# Patient Record
Sex: FEMALE | Race: WHITE | NOT HISPANIC OR LATINO | Employment: UNEMPLOYED | URBAN - METROPOLITAN AREA
[De-identification: names, ages, dates, MRNs, and addresses within clinical notes are randomized per-mention and may not be internally consistent; named-entity substitution may affect disease eponyms.]

---

## 2021-02-12 ENCOUNTER — HISTORICAL (OUTPATIENT)
Dept: LAB | Facility: HOSPITAL | Age: 56
End: 2021-02-12

## 2021-02-12 LAB
ALBUMIN SERPL-MCNC: 4 GM/DL (ref 3.5–5)
ALP SERPL-CCNC: 52 UNIT/L (ref 40–150)
ALT SERPL-CCNC: 17 UNIT/L (ref 0–55)
AST SERPL-CCNC: 20 UNIT/L (ref 5–34)
BILIRUB SERPL-MCNC: 0.5 MG/DL
BILIRUBIN DIRECT+TOT PNL SERPL-MCNC: 0.2 MG/DL (ref 0–0.5)
BILIRUBIN DIRECT+TOT PNL SERPL-MCNC: 0.3 MG/DL (ref 0–0.8)
BUN SERPL-MCNC: 13.4 MG/DL (ref 9.8–20.1)
CALCIUM SERPL-MCNC: 9 MG/DL (ref 8.4–10.2)
CHLORIDE SERPL-SCNC: 102 MMOL/L (ref 98–107)
CO2 SERPL-SCNC: 28 MMOL/L (ref 22–29)
CREAT SERPL-MCNC: 0.58 MG/DL (ref 0.55–1.02)
CREAT/UREA NIT SERPL: 23
ERYTHROCYTE [DISTWIDTH] IN BLOOD BY AUTOMATED COUNT: 12.9 % (ref 11.5–17)
GLUCOSE SERPL-MCNC: 91 MG/DL (ref 74–100)
HCT VFR BLD AUTO: 41.6 % (ref 37–47)
HGB BLD-MCNC: 13.5 GM/DL (ref 12–16)
MCH RBC QN AUTO: 31 PG (ref 27–31)
MCHC RBC AUTO-ENTMCNC: 32.5 GM/DL (ref 33–36)
MCV RBC AUTO: 95.4 FL (ref 80–94)
PLATELET # BLD AUTO: 322 X10(3)/MCL (ref 130–400)
PMV BLD AUTO: 10 FL (ref 9.4–12.4)
POTASSIUM SERPL-SCNC: 3.7 MMOL/L (ref 3.5–5.1)
PROT SERPL-MCNC: 6.8 GM/DL (ref 6.4–8.3)
RBC # BLD AUTO: 4.36 X10(6)/MCL (ref 4.2–5.4)
SODIUM SERPL-SCNC: 138 MMOL/L (ref 136–145)
WBC # SPEC AUTO: 5 X10(3)/MCL (ref 4.5–11.5)

## 2021-04-27 ENCOUNTER — HISTORICAL (OUTPATIENT)
Dept: RADIOLOGY | Facility: HOSPITAL | Age: 56
End: 2021-04-27

## 2021-05-25 LAB — POC BETA-HCG (QUAL): NEGATIVE

## 2021-06-01 ENCOUNTER — HISTORICAL (OUTPATIENT)
Dept: RADIOLOGY | Facility: HOSPITAL | Age: 56
End: 2021-06-01

## 2021-06-03 LAB — POC BETA-HCG (QUAL): NEGATIVE

## 2021-06-14 ENCOUNTER — HISTORICAL (OUTPATIENT)
Dept: ADMINISTRATIVE | Facility: HOSPITAL | Age: 56
End: 2021-06-14

## 2021-06-14 LAB
APPEARANCE, UA: CLEAR
BACTERIA #/AREA URNS AUTO: ABNORMAL /HPF
BILIRUB UR QL STRIP: NEGATIVE
COLOR UR: NORMAL
GLUCOSE (UA): NEGATIVE
HGB UR QL STRIP: NEGATIVE
HYALINE CASTS #/AREA URNS LPF: ABNORMAL /LPF
KETONES UR QL STRIP: NEGATIVE
LEUKOCYTE ESTERASE UR QL STRIP: NEGATIVE
NITRITE UR QL STRIP: NEGATIVE
PH UR STRIP: 7 [PH] (ref 4.5–8)
PROT UR QL STRIP: NEGATIVE
RBC #/AREA URNS AUTO: ABNORMAL /HPF
SP GR UR STRIP: 1.02 (ref 1–1.03)
SQUAMOUS #/AREA URNS LPF: ABNORMAL /LPF
UROBILINOGEN UR STRIP-ACNC: NORMAL
WBC #/AREA URNS AUTO: ABNORMAL /HPF

## 2021-07-03 ENCOUNTER — HISTORICAL (OUTPATIENT)
Dept: LAB | Facility: HOSPITAL | Age: 56
End: 2021-07-03

## 2021-07-03 LAB
ABS NEUT (OLG): 2.77 X10(3)/MCL (ref 2.1–9.2)
ALBUMIN SERPL-MCNC: 4 GM/DL (ref 3.5–5)
ALBUMIN/GLOB SERPL: 1.5 RATIO (ref 1.1–2)
ALP SERPL-CCNC: 51 UNIT/L (ref 40–150)
ALT SERPL-CCNC: 14 UNIT/L (ref 0–55)
AST SERPL-CCNC: 18 UNIT/L (ref 5–34)
BASOPHILS # BLD AUTO: 0 X10(3)/MCL (ref 0–0.2)
BASOPHILS NFR BLD AUTO: 1 %
BILIRUB SERPL-MCNC: 0.5 MG/DL
BILIRUBIN DIRECT+TOT PNL SERPL-MCNC: 0.2 MG/DL (ref 0–0.5)
BILIRUBIN DIRECT+TOT PNL SERPL-MCNC: 0.3 MG/DL (ref 0–0.8)
BUN SERPL-MCNC: 13.3 MG/DL (ref 9.8–20.1)
CALCIUM SERPL-MCNC: 8.6 MG/DL (ref 8.4–10.2)
CHLORIDE SERPL-SCNC: 104 MMOL/L (ref 98–107)
CHOLEST SERPL-MCNC: 216 MG/DL
CHOLEST/HDLC SERPL: 4 {RATIO} (ref 0–5)
CO2 SERPL-SCNC: 27 MMOL/L (ref 22–29)
CREAT SERPL-MCNC: 0.68 MG/DL (ref 0.55–1.02)
DEPRECATED CALCIDIOL+CALCIFEROL SERPL-MC: 43.9 NG/ML (ref 30–80)
EOSINOPHIL # BLD AUTO: 0.1 X10(3)/MCL (ref 0–0.9)
EOSINOPHIL NFR BLD AUTO: 2 %
ERYTHROCYTE [DISTWIDTH] IN BLOOD BY AUTOMATED COUNT: 12.8 % (ref 11.5–17)
GLOBULIN SER-MCNC: 2.6 GM/DL (ref 2.4–3.5)
GLUCOSE SERPL-MCNC: 89 MG/DL (ref 74–100)
HCT VFR BLD AUTO: 40.9 % (ref 37–47)
HDLC SERPL-MCNC: 56 MG/DL (ref 35–60)
HGB BLD-MCNC: 14.1 GM/DL (ref 12–16)
IMM GRANULOCYTES # BLD AUTO: 0.01 % (ref 0–0.02)
IMM GRANULOCYTES NFR BLD AUTO: 0.2 % (ref 0–0.43)
LDLC SERPL CALC-MCNC: 134 MG/DL (ref 50–140)
LYMPHOCYTES # BLD AUTO: 2.2 X10(3)/MCL (ref 0.6–4.6)
LYMPHOCYTES NFR BLD AUTO: 40 %
MCH RBC QN AUTO: 31.8 PG (ref 27–31)
MCHC RBC AUTO-ENTMCNC: 34.5 GM/DL (ref 33–36)
MCV RBC AUTO: 92.3 FL (ref 80–94)
MONOCYTES # BLD AUTO: 0.4 X10(3)/MCL (ref 0.1–1.3)
MONOCYTES NFR BLD AUTO: 7 %
NEUTROPHILS # BLD AUTO: 2.77 X10(3)/MCL (ref 1.4–7.9)
NEUTROPHILS NFR BLD AUTO: 51 %
PLATELET # BLD AUTO: 267 X10(3)/MCL (ref 130–400)
PMV BLD AUTO: 9.4 FL (ref 9.4–12.4)
POTASSIUM SERPL-SCNC: 4.1 MMOL/L (ref 3.5–5.1)
PROT SERPL-MCNC: 6.6 GM/DL (ref 6.4–8.3)
RBC # BLD AUTO: 4.43 X10(6)/MCL (ref 4.2–5.4)
SODIUM SERPL-SCNC: 141 MMOL/L (ref 136–145)
TRIGL SERPL-MCNC: 132 MG/DL (ref 37–140)
TSH SERPL-ACNC: 2.49 UIU/ML (ref 0.35–4.94)
VLDLC SERPL CALC-MCNC: 26 MG/DL
WBC # SPEC AUTO: 5.4 X10(3)/MCL (ref 4.5–11.5)

## 2021-08-12 ENCOUNTER — HISTORICAL (OUTPATIENT)
Dept: ADMINISTRATIVE | Facility: HOSPITAL | Age: 56
End: 2021-08-12

## 2021-08-17 ENCOUNTER — HISTORICAL (OUTPATIENT)
Dept: ADMINISTRATIVE | Facility: HOSPITAL | Age: 56
End: 2021-08-17

## 2021-08-17 LAB — SARS-COV-2 AG RESP QL IA.RAPID: NEGATIVE

## 2021-08-19 ENCOUNTER — HISTORICAL (OUTPATIENT)
Dept: SURGERY | Facility: HOSPITAL | Age: 56
End: 2021-08-19

## 2021-08-19 LAB
ABS NEUT (OLG): 2.68 X10(3)/MCL (ref 2.1–9.2)
BASOPHILS # BLD AUTO: 0 X10(3)/MCL (ref 0–0.2)
BASOPHILS NFR BLD AUTO: 1 %
EOSINOPHIL # BLD AUTO: 0.1 X10(3)/MCL (ref 0–0.9)
EOSINOPHIL NFR BLD AUTO: 2 %
ERYTHROCYTE [DISTWIDTH] IN BLOOD BY AUTOMATED COUNT: 13.1 % (ref 11.5–14.5)
HCT VFR BLD AUTO: 41.9 % (ref 35–46)
HGB BLD-MCNC: 13.9 GM/DL (ref 12–16)
IMM GRANULOCYTES # BLD AUTO: 0.01 10*3/UL
IMM GRANULOCYTES NFR BLD AUTO: 0 %
LYMPHOCYTES # BLD AUTO: 1.8 X10(3)/MCL (ref 0.6–4.6)
LYMPHOCYTES NFR BLD AUTO: 36 %
MCH RBC QN AUTO: 30.7 PG (ref 26–34)
MCHC RBC AUTO-ENTMCNC: 33.2 GM/DL (ref 31–37)
MCV RBC AUTO: 92.5 FL (ref 80–100)
MONOCYTES # BLD AUTO: 0.4 X10(3)/MCL (ref 0.1–1.3)
MONOCYTES NFR BLD AUTO: 9 %
NEUTROPHILS # BLD AUTO: 2.68 X10(3)/MCL (ref 2.1–9.2)
NEUTROPHILS NFR BLD AUTO: 53 %
NRBC BLD AUTO-RTO: 0 % (ref 0–0.2)
PLATELET # BLD AUTO: 286 X10(3)/MCL (ref 130–400)
PMV BLD AUTO: 9.4 FL (ref 7.4–10.4)
RBC # BLD AUTO: 4.53 X10(6)/MCL (ref 4–5.2)
WBC # SPEC AUTO: 5.1 X10(3)/MCL (ref 4.5–11)

## 2021-10-21 ENCOUNTER — HISTORICAL (OUTPATIENT)
Dept: ADMINISTRATIVE | Facility: HOSPITAL | Age: 56
End: 2021-10-21

## 2021-11-01 ENCOUNTER — HISTORICAL (OUTPATIENT)
Dept: ADMINISTRATIVE | Facility: HOSPITAL | Age: 56
End: 2021-11-01

## 2021-11-02 ENCOUNTER — HISTORICAL (OUTPATIENT)
Dept: SURGERY | Facility: HOSPITAL | Age: 56
End: 2021-11-02

## 2022-04-07 ENCOUNTER — HISTORICAL (OUTPATIENT)
Dept: ADMINISTRATIVE | Facility: HOSPITAL | Age: 57
End: 2022-04-07

## 2022-04-24 VITALS
OXYGEN SATURATION: 97 % | BODY MASS INDEX: 24.61 KG/M2 | HEIGHT: 65 IN | DIASTOLIC BLOOD PRESSURE: 76 MMHG | SYSTOLIC BLOOD PRESSURE: 107 MMHG | WEIGHT: 147.69 LBS

## 2022-05-02 NOTE — HISTORICAL [facility] CERNER
This is a historical note converted from [employer]. Formatting and pictures may have been removed.  Please reference [employer]ner for original formatting and attached multimedia. Indication for Surgery  57 yo with symptomatic stage II Ba pelvic organ prolapse as well as stress urinary incontinence  Preoperative Diagnosis  1. Stage II Ba pelvic organ prolapse  2. Stress urinary incontinence  Postoperative Diagnosis  Same  Operation  Total vaginal hysterectomy, bilateral salpingectomy, McCalls culdoplasty, mid-urethral sling, perineorraphy  Surgeon(s)  Attending: Grant Moreira MD  Fellow: River Alarcon MD  Resident: Antonieta Wheeler MD (-IV)  Anesthesia  General endotracheal  Estimated Blood Loss  150 ml  Findings  EUA: normal appearing external female genitalia with apical and anterior prolapse clearly visible, minimal palpable cervix, small 5 cm uterus mobile with excellent descent, no adnexal fullness  Intraop: Some vesicovaginal scar tissue evident; bilateral ovaries normal in appearance  Cysto: efflux of urine visualized from bilateral ureteral orifices, bladder survey performed with no mesh,?bladder injury, defect, or suture visualized  Specimen(s)  Uterus, cervix, bilateral fallopian tubes  Complications  None  Technique  The patient was taken to the OR where general anesthesia was easily obtained. ?She received?Ancef 2g?for infection prophylaxis?and SCDs for DVT prophylaxis. She was prepped and draped in the normal sterile fashion in?high lithotomy position.??[place] retractor placed without difficulty. The cervix was grasped with a tumor tenaculum along both the anterior and posterior lip.  ?   An incision was made at the cervicovaginal junction from 10?to 2 oclock position?with the 10 blade scalpel.?With use of a raytec and smooth pickup, blunt dissection was then performed.?Decision made to attempt entry posteriorly as scar tissue noted anteriorly likely due to  history. With use of an allis clamp along  the posterior cervix, posterior colpotomy was made without difficulty via use of curved roberts scissors and the posterior cul de sac was explored with no injury or mass noted. One interrupted suture of #0 Vicryl was placed to affix the posterior peritoneum to the posterior vaginal mucosa. The bilateral uterosacral ligaments were clamped with curved Canelo clamp,?cut, suture ligated with 0-Vicryl.?[place] retractor?placed in the posterior cul de sac. The bilateral?cardinal ligament was clamped , ligated and cut with 0-Vicryl. The vesicouterine peritoneal reflection was identified and sharply incised to?enter the anterior peritoneum. The anterior cul de sac was explored with no evidence of injury or mass. ?The remainder of the cardinal and broad ligaments were then serially clamped,?ligated, and cut with 0-Vicryl. Fundus was then identified and the utero-ovarian vessels and round ligament were clamped, cut and suture ligated. The uterus and cervix were removed intact and submitted to pathology for examination.?Bilateral fallopian tubes visualized. The right Fallopian tube was grasped with a Rebecca and walked out to the fimbriated end. Clamped with Vincent clamp along the mesosalpinx, suture ligated, and excised. Salpingectomy performed in a similar fashion on the left. The pedicles were examined and were hemostatic. Attention was then paid to the suspensory portion of the procedure.?One 2-0 double-armed Prolene suture was placed through the?left uterosacral ligament, the reefed across the posterior peritoneum in continuous fashion ending in the right uterosacral ligament.?This was then performed from the left. A 2-0 double armed Prolene suture was placed in the right uterosacral ligament, then reefed across the posterior peritoneum, ending in the?left. Pulley sutures then placed in the pubocervical and rectovaginal tissues. Sutures then tied with noted suspension of vaginal apex.?  ?   Attention then turned to?placement  of mid-urethral sling. Mid urethra was identified. 2 Allis clamps were placed on either side of the mid urethra.? A 1% saline solution was used to hydrodissect the area of the incision.? A 1 cm craniocaudad incision was made in the mid urethra through the vaginal epithelium.?The Allis clamps were readjusted and the Metzenbaum scissors were used to create tunnels on either side of the urethra towards the pubic rami.?The TVT exact mid urethral sling was opened.? A Colón with catheter guide was placed and deviated to the patients left side.? The left side of the TVT trocar was then passed through the aforementioned tunnel with the trocar placed lateral to the urethra in the dissection plane and advanced underneath the pubic?ramus exiting approximately 1.5 cm lateral to midline in the suprapubic region.? The Colón catheter guide was in the deviated to the patients right side. The right side of the TVT trocar mesh system was then loaded and the trocar was placed in the right lateral tunnel and advanced underneath the pubic ramus, hugging the pubic bone and exiting 1.5 cm lateral to the midline on the right side in the suprapubic region.  ?   A cystoscopy was then performed with a 70 degree scope and there was was no apparent bladder injury after full distention.? Intraoperative anatomical findings as above.? The cystoscope was removed and?the bladder was drained.? A Reese clamp was used for tensioning of the mid urethral sling and?the mesh arms were pulled through the suprapubic incisions until the appropriate tightness had been achieved?with mesh was situated approximately 1 cm away from the urethra using the reese clamp for spacing..? The mesh arms were cut and deployed.? The vaginal epithelium was reapproximated with 3-0 Vicryl in a running locked fashion.? Excellent hemostasis was noted.? The suprapubic sites were closed with ex-glue after being trimmed at the level of the skin.? Mesh noted to be completed covered at  all sites at the completion of this portion of procedure.  ?  The vaginal cuff was then closed in a continuous running fashion using 0-Vicryl.  ?  Attention was then turned to the perineorrhaphy portion of the procedure. Posterior vaginal epithelium was identified and grasped with Allis clamps. A #10 blade was then used to excise layer of posterior vaginal epithelium. Metzenbaum scissors? and a 15-blade were then used to undermine the vaginal epithelium in the shape of an inverted V between 5 and 7 oclock on the poster vaginal epithelium. This area of epithelium was then excised with Metzenbaum scissors. The edges of the vaginal epithelium were then brought together in a continuous running fashion and perineum repaired in subcuticular fashion using 3-0 Vicryl on an SH. A figure of eight?was placed to bring the transverse perineal muscles together using 2-0 Vicryl. Excellent hemostasis noted throughout case.  ?  The patient tolerated the procedure well. ?The needle, sponge and instrument counts were correct x2. Patient went to the recovery?room in stable condition. Colón catheter was placed at completion of procedure with return of clear, yellow urine.  Dr. Moreira present and scrubbed for entirety of procedure.  As above.?  ?  Antonieta Wheeler MD  [facility], PGY-4

## 2022-05-02 NOTE — HISTORICAL [facility] CERNER
This is a historical note converted from [employer]. Formatting and pictures may have been removed.  Please reference [employer] for original formatting and attached multimedia. Interval Update:  ?  Patient presents today for scheduled TVH/BS/McCalls/A/P repair/TVT/Cysto. She has no complaints today and there are no changes to the H&P.?She is able to describe the procedure in her own words. Presents today with sister Clarissa, who can be reached [phone].  ?   Vitals pending  ?   Physical Exam:  General Appearance: Alert, cooperative, no distress  Lungs: Respirations unlabored  Heart: Regular rate  Abdomen: Soft, non-distended, non-tender  Extremities: Extremities normal, atraumatic, no cyanosis or edema  Skin: Skin turgor normal, no rashes or lesions  ?  ?   Plan:  Consents reviewed. To OR for TVH/BS/McCalls/A/P repair/TVT/Cysto with Dr. Moreira.  ?  Chief Complaint  URO-DYNAMICS  History of Present Illness  57yo presents for urodynamics and discussion of surgical mgmt for her symptomatic Stage II prolapse. She is doing well today with no new complaints. She has complained of mixed incontinence in the past, not on any medications, and she did not demonstrate leakage on CST.  ?  DTF 5x  NTF 1x  UUI none  MERISSA yes, small leakage weekly  ?  Surgical hx- HSC, D&C and 2x CD  ?  Previous EUA uterus 6w size  ?  TVUS:  FINDINGS: The uterus measures 4.6 x 2.5 x 3.8 cm and demonstrates a  1.0 cm hyperechoic structure abutting the endometrium in the posterior  body of the uterus. The endometrial thickness is 4 mm. There is no  endometrial fluid. The uterus is otherwise unremarkable.  ?  The right ovary was not definitively seen. This is most likely  secondary to its small size and/or bowel gas artifact.  ?  The left ovary measures 1.7 x 1.0 x 1.9 cm and is unremarkable. There  is intact vascular flow to the left ovary.  ?  There is no free fluid in the visualized pelvis.  ?  Hysteroscopy/D&C:? Scant thin strips of benign  endometrial epithelium with minimal stroma.  ?  ?  Review of Systems  Review of Systems  Constitutional: No fever, No chills.  Respiratory: No shortness of breath.  Cardiovascular: No chest pain, No syncope.  Gastrointestinal: No nausea, No vomiting, No diarrhea, No constipation.  Genitourinary: No dysuria, No hematuria, No abnormal discharge or bleeding.  Neurologic: Alert and oriented X3  Physical Exam  ???Vitals & Measurements  ??T:?37.0? ?C (Oral)? HR:?90(Peripheral)? RR:?20? BP:?111/79?  ??WT:?66.200?kg?  Urodynamics Report  ?  Urinalysis: Negative for all components  ?   Complex Uroflow: (performed using computerized uroflowmetry)  Voided volume:?281 cc  QMax:??7.9 cc/sec  QAvg:?5.8 cc/sec  PVR:??30 cc  ?  Comments:?Prlonged voiding pattern but no residual. ?This portion of the test was done by backfilling the bladder?to 250  ?  Complex Cystometrogram: Performed using a #7 [place] dual lumen urethral catheter with a simultaneous rectal catheter placement for abdominal pressure monitoring. Saline was instilled into the bladder at a rate of 40 cc/min.  ?  First Sensation (cc): 36  First Desire (cc): 88  Strong Desire (cc): 149  Urgency (cc): 227  Cystometric capacity:?296  ?  Sensation:?normal  Compliance:?normal  Capacity:?slightly low  Detrusor overactivity:?not present  ?  Comments:?No evidence of DO.?Slightly lower capacity.  ?  Leak Pressure Testing: Performed utilizing both urethral and rectal balloon catheters for simultaneous vesical and abdominal pressure monitoring. Straining maneuvers consisting of coughing and valsalva efforts were performed starting at a infusion volume of 149?cc  ?  CLPP:42  ?   Leakage was? seen?with cough/valsalva?with and without prolapse reduction at strong desire, urgency, and capacity  ?  Pressure Flow Test: Performed utilizing both bladder and rectal catheters for detrusor, vesical, and abdominal pressure monitoring.  ?  Detrusor contraction: Present?  QMax  (ml/sec):??9.4  QAvg (ml/sec): 2.5  Max Pdet (cm H2O): 26  Voided volume (ml): 280  PVR (ml):?16  ?   Comments:  Voids by detrusor, prolonged pattern, but no evidence of WEBB otherwise  ?  ?  ?  Aa: 0  Ba: +1  C: -4  D: -5  Ap: -3  Bp: -3  Gh: 4.5  Pb: 3  TVL?: 8  Assessment/Plan  1.?Urinary incontinence, mixed?N39.46  ???-See above on UDS, demonstrated MERISSA. Will plan for? MUS at the time of prolapse surgery  ??Ordered:  Complex Uroflowmetry - Uro Procedure PC, 10/18/21 16:20:00 CDT, 10/18/21 16:20:00 CDT, Urinary incontinence, mixed  Prolapse of female pelvic organs  Cystometrogram w/void pressr - Uro Cysto PC, 10/18/21 16:20:00 CDT, 10/18/21 16:20:00 CDT, Urinary incontinence, mixed  Prolapse of female pelvic organs  Intra-Abdominal void Pressure - Uro Procedure PC, 10/18/21 16:20:00 CDT, 10/18/21 16:20:00 CDT, Urinary incontinence, mixed  Prolapse of female pelvic organs  ?  2.?Prolapse of female pelvic organs?N81.9  ??-We discussed her UDS findings in relation to her exam findings and her desire for hysterectomy and reconstructive procedure. She has been worked up for AUB but pathology returned benign.  - I reviewed with her the options for reconstructive procedures such as mesh augmented vs native tissue repairs.  - She would like to proceed with ??TVH, BS, Shaun, Poss A/P repair, and MUS with cysto planned for 11/2/21  - The ?procedure and its risks, reason, benefits, and complications were reviewed in detail. Complications discussed included injury to bowel, bladder, major blood vessels, ureter, bleeding, possible need for blood transfusion, infection, scarring, dyspareunia, further surgery (laparotomy), worsening incontinence, failure of procedure, or fistula formation. Patient amenable to blood transfusion if needed.  ?   ??Alternatives to this planned procedure were explained to the patient including expectant, PT, pessary, and other types of surgical management.  ?  Risks that are specific to this  patient were also discussed including acquisition of COVID 19.  ?  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 She has consented to a pelvic?exam under anesthesia and? understands that medical students participate in this portion of the procedure. Surgical consents were signed and witnessed.  ?  We reviewed the typical perioperative course, anticipation of same day discharge home. Instructions reviewed, including NPO after midnight prior to surgery. Post-operative precautions were reviewed. ??Discussed?outpatient surgery expectations and recovery time.  ? RC for vag hyst with Dunn and TVT sling  ??PACE appointment requested.  Consents reviewed with patient and signed.  Plan for CBC and T&S DOS  SCDs for DVT prophylaxis  Abx: Ancef  ?  ?  ??Ordered:  Complex Uroflowmetry - Uro Procedure PC, 10/18/21 16:20:00 CDT, 10/18/21 16:20:00 CDT, Urinary incontinence, mixed  Prolapse of female pelvic organs  Cystometrogram w/void pressr - Uro Cysto PC, 10/18/21 16:20:00 CDT, 10/18/21 16:20:00 CDT, Urinary incontinence, mixed  Prolapse of female pelvic organs  Intra-Abdominal void Pressure - Uro Procedure PC, 10/18/21 16:20:00 CDT, 10/18/21 16:20:00 CDT, Urinary incontinence, mixed  Prolapse of female pelvic organs  ?  RRC? for vaginal hysterectomy with Dunn, AP TVT and cysto.  ? Problem List/Past Medical History  Ongoing  ??Postmenopause bleeding  ?Prolapse of female pelvic organs  Historical  ??Pregnant  ??Pregnant  Procedure/Surgical History  ?Extraction of Endometrium, Via Natural or Artificial Opening (2021)  Hysteroscopic Dilation & Curettage (None) (2021)  Hysteroscopy, surgical; with sampling (biopsy) of endometrium and/or polypectomy, with or without D & C (2021)  Inspection of Uterus and Cervix, Via Natural or Artificial Opening Endoscopic (2021)   x2  t&a   ?  Medications  ?estradiol 0.1  mg/g vaginal cream, 1 gm, VAG,? ?Not taking  ?LamISIL 250 mg oral tablet, 250 mg= 1 tab(s), Oral, Daily,? ?Not Taking, Completed Rx  ?One A Day Womens Prenatal  ?ZyrTEC, Daily  Allergies  No Known Medication Allergies  Social History  Abuse/Neglect  ?No, 08/19/2021  Alcohol  ?Current, Wine, 1-2 times per year, 10/18/2021  Employment/School  ?Unemployed, 06/14/2021  Exercise  ?Exercise duration: 0., 10/18/2021  Home/Environment  ?Lives with Children. Living situation: Home/Independent., 06/14/2021    ?Never in , 06/14/2021  Nutrition/Health  ?Regular, Good, 06/14/2021  Sexual  ?Sexually active: No., 06/14/2021  Spiritual/Cultural  ?Voodoo, 06/14/2021  Substance Use  ?Never, 06/14/2021  Tobacco  ?Never (less than 100 in lifetime), N/A, 10/04/2021  Family History  ?Heart disease: Father.  Immunizations  ??Vaccine ?Date ?Status   ?COVID-19 MRNA, LNP-S, PF- Pfizer 04/13/2021 Given   ?COVID-19 MRNA, LNP-S, PF- Pfizer 03/23/2021 Given   ?  Health Maintenance  Health Maintenance  ???Pending?(in the next year)  ??? ??OverDue  ??? ? ? ?Alcohol Misuse Screening due??01/02/21??and every 1??year(s)  ??? ??Due?  ??? ? ? ?ADL Screening due??10/18/21??and every 1??year(s)  ??? ? ? ?Aspirin Therapy for CVD Prevention due??10/18/21??and every 1??year(s)  ??? ? ? ?Colorectal Screening due??10/18/21??Unknown Frequency  ??? ? ? ?Tetanus Vaccine due??10/18/21??and every 10??year(s)  ??? ? ? ?Zoster Vaccine due??10/18/21??Unknown Frequency  ??? ??Due In Future?  ??? ? ? ?Obesity Screening not due until??01/01/22??and every 1??year(s)  ???Satisfied?(in the past 1 year)  ??? ??Satisfied?  ??? ? ? ?Blood Pressure Screening on??10/18/21.??Satisfied by Bijal Burkett LPN  ??? ? ? ?Body Mass Index Check on??10/04/21.??Satisfied by Remedios Floyd RN  ??? ? ? ?Breast Cancer Screening on??08/27/21.??Satisfied by Saúl Holguin MD  ??? ? ? ?Cervical Cancer Screening on??05/25/21.??Satisfied by Rossy Avila  ??? ? ?  ?Depression Screening on??10/18/21.??Satisfied by Sveero Burkett LPNna  ??? ? ? ?Diabetes Screening on??07/03/21.??Satisfied by Alvaro Fallon  ??? ? ? ?Influenza Vaccine on??10/18/21.??Satisfied by Severo Burkett LPNna  ??? ? ? ?Lipid Screening on??07/03/21.??Satisfied by Alvaro Fallon  ??? ? ? ?Obesity Screening on??10/18/21.??Satisfied by Bijal Burkett LPN  ?   Result type: Office Clinic Note-Physician   Result date: October 18, 2021 17:34 CDT   Result status: Auth (Verified)   Result title: Office Visit Note   Performed by: Eve STAFFORD, Vicky FRANCISCO on October 18, 2021 17:34 CDT   Verified by: Grant Moreira MD on November 01, 2021 12:55 CDT   Encounter info: [id number], [facility], Clinic Visit, 10/18/2021 - 10/18/2021

## 2022-05-02 NOTE — HISTORICAL [facility] CERNER
This is a historical note converted from [employer]. Formatting and pictures may have been removed.  Please reference [employer] for original formatting and attached multimedia. Indication for Surgery  56  with PMB  Preoperative Diagnosis  PMB  Postoperative Diagnosis  PMB  Operation  Hysteroscopy D&C  Surgeon(s)  BARB Wakefield MD?(Staff)  MERCEDES Harper MD (HO III)  Anesthesia  General  Estimated Blood Loss  5mL  Urine Output  20mL  ?  IVF:  600mL  ?   Deficit:  100mL  Findings  Exam under anesthesia: gross normal appearing external female genitalia without lesions or masses. Grossly normal appearing cervix without lesions or masses. Significant anterior prolapse noted. Anterior vagina noted to be 1cm beyond the vaginal introitus.Vaginal mucosa pink and well estrogenized no lesions or masses. Uterus was mobile, ~6 week size.?Moderate descent and adequate vaginal capacity with at least 5cm between ischial spines. No adnexal masses palpated.  Hysteroscopy: Uterus with atrophic appearing cavity and no gross abnormalities.?Tubal ostia noted bilaterally.  ?  Specimen(s)  Endometrial Curettings  Complications  None  Technique  Patient was taken to the operating room with IVF running. SCDs were placed on bilateral lower extremities?for DVT prophylaxis.?General anesthesia was obtained without difficulty.?She was placed in the dorsal lithotomy position with legs in Mark type stirrups.?EUA revealed findings as above. She was prepped and draped in the normal sterile fashion. A straight catheter was placed to drain the bladder. A weight speculum was inserted into the vaginal vault and the anterior lip of the cervix was grasped with a single-toothed tenaculum. The 4mm Betocchi hysteroscope was introduced into the cervix with hydrodistension with the findings mentioned above. Photographs were taken of the endometrial cavity.The hysteroscope was then removed. The uterus was curetted in a clockwise fashion. The endometrial scrapings were  sent to pathology. The tenaculum was removed from the cervix and good hemostasis was noted at puncture sites.?The patient tolerated the procedure well. ? was present for the entire procedure.?  ?   Keke Harper MD  [facility]  ?   I was present with the resident during all critical and key portions of the procedure and agree with the findings documented in the residents note.  Dr. BARB Wakefield MD

## 2022-05-02 NOTE — HISTORICAL [facility] CERNER
This is a historical note converted from Efrain. Formatting and pictures may have been removed.  Please reference Efrain for original formatting and attached multimedia. Interval H&P  55  with PMB here for scheduled hysteroscopy D&C. Patient is doing well this morning without complaints. She was able to describe the procedure in her own words. She is accompanied by?her son?Anuel?who can be reached at [phone]  ?   Vitals  ?Event Name? Event Result? Date/Time?   Temperature Oral 36.8 DegC 21 09:35:57   Peripheral Pulse Rate 79 bpm 21 09:35:57   Respiratory Rate 20 br/min 21 09:35:00   SpO2 99 % 21 09:35:57   Systolic Blood Pressure 126 mmHg 21 09:35:57   Diastolic Blood Pressure 80 mmHg 21 09:35:57   Mean Arterial Pressure, Cuff 95 mmHg 21 09:35:57   ?  ?  ?   Physical Exam  General: AAx03, NAD  Heart: RRR, normal S1/S2,?no murmurs appreciated  Lungs: CTABL, no wheezes  Abdomen: soft, obese, nondistended, nontender  Extremities: no edema, no calf tenderness, SCDs on and functioning  ?   The History and Physical have been reviewed in the chart and with the patient and there have been no interval changes. Consents were reviewed.?All questions answered at the bedside.?To OR for Hysteroscopy D&C  ?   Keke Harper MD [facility]  ?  History of Present Illness  55 Years with PMB and pelvic organ prolapse?who presents for surgical discussion of hysteroscopy D&C secondary to insufficient office EMB.  ?  HPI: 54 yo  presents as new patient referral from Dr. Holguin for chief complaint of pelvic organ prolapse. Pt had recent episode of PMB beginning of May  ?  ENDOMETRIAL BIOPSY:  EXTREMELY SCANT SPECIMEN CONSISTING OF A FEW STRIPS OF BENIGN ENDOCERVICAL EPITHELIUM, METAPLASTIC TYPE SQUAMOUS EPITHELIUM, INFLAMMATORY CELLS, RED BLOOD CELLS AND ISOLATED ENDOMETRIAL SURFACE EPITHELIAL CELLS.  NO ATYPICAL OR MALIGNANT CELLS IDENTIFIED.  COMMENT: ?No intact fragments of endometrial  mucosa are identified.  ?  HLD: not on any medications  ?  PSH: CKC, c section x2, tonsillectomy  Social hx: neg x3  ?  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  LMP: postmenopausal  Not currently sexually active  Denies Hx of STIs  CKC >20 years ago  ?  Contraception:?  Last Pap: 5/21 NILM, HPV-  Last Mammogram: 8/2020 BIRADS1  Conlonoscopy:  Denies family h/o of breast, uterine, ovarian, or colon cancer?  Review of Systems  The patient denies the following:?? (positive ROS is highlighted)  Constitutional:? weight loss, weight gain, fever/chills, night sweats, excessive fatigue  Endocrine: heat or cold intolerance, excessive thirst, abnormal hair growth?  Eyes, Ears, Nose & Throat: visual problems, hearing problems, nose bleeds, sinus problems, sore throat  Gastrointestinal: frequent heartburn, abdominal pain, blood in stools, diarrhea, constipation  Hematologic: easy bruising, bleeding gums, prolonged bleeding, clotting problems  Cardiovascular: chest pain, palpitations, trouble breathing when lying flat  Respiratory:?shortness of breath, chronic cough, wheezing  Skin: itching,?rash, new moles or lesions  Psychosocial:? difficulty sleeping, anxiety or panic attacks,? depression  Gynecologic: see HPI  Urinary:?stress incontinence, urge incontinence, hematuria, frequency, urgency, dysuria, difficulty urinating,?bulge in vagina  Neurologic: headaches, dizziness, memory loss.  Musculoskeletal:?joint?stiffness,?joint?pain/swelling,?back pain.  Physical Exam  General: NAD, A/Ox3. Well appearing.?  Respiratory: CTAB  Cardiovascular: RRR  Abdomen: soft, nondistended, nontender to palpation  Incisions: _  Extremities: no edema, no calf tenderness  ?  Pelvic exam per Dr. Wheat 6/21  Pelvic: NEFG without lesion. pale pink atrophic vaginal mucosa, no rugae noted, but without lesions. normal appearing cervix  with smooth contour, no gross lesions/masses. small sized uterus, mobile, retroverted. no adnexal fullness/tenderness. no bladder tenderness.  Stage II anterior POP  Assessment/Plan  1.?Postmenopause bleeding?N95.0  ?  2.?Preop examination?Z01.818  ??55  with PMB and insufficient in office EMB and plan for hysteroscopy D&C.  ?  She was counseled that the benefits of doing a hysteroscopy are to possibly diagnosis and treat her medical problem. ??The risks of a hysteroscopy include, but are not limited to cramping, injury or damage to the bowel, bladder, ovaries, uterus, fallopian tubes, nerves, as well as blood vessels.??If these organs are damage, they may require repair.??If such an injury were to occur, a laparoscope or an abdominal incision (exploratory laparotomy) may be required in order to repair the damage.??Additional risks specific to hysteroscopy include?fluid imbalance and perforation of the uterus. Alternatives to this planned procedure were explained to the patient including expectant, medical, and other types of surgical management.  ?  ??Risks that are specific to this patient were also discussed including acquisition of COVID 19.  ?  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 She has consented to a pelvic?exam under anesthesia and? understands that medical students participate in this portion of the procedure. Surgical consents were signed and witnessed.  ?  We reviewed the typical perioperative course, anticipation of same day discharge home. Instructions reviewed, including NPO after midnight prior to surgery. Post-operative precautions were reviewed. ??Discussed?outpatient surgery expectations and recovery time.  ?  ??PACE appointment requested.  Consents reviewed with patient and signed.  Labs today: None  Labs DOS: T&S,  Meds DOS: None  Caprini score?2;?SCDs for DVT prophylaxis  Abx: none  Post-op  appt 2 weeks via telemedicine  ?  ??To OR for?Hysteroscopy D&C, possible polypectomy?on 8/19/2021  ?  Discussed with Dr. Wakefield  ?  ??Keke Harper MD  [facility]?  ?  55 F P2 with PMB  ??I have met this patient and agree with the above history and physical.  She understands the procedure as described above with associated risks.  All questions have been answered and consents have been signed.  [place] [facility] [place]

## 2022-05-28 ENCOUNTER — HOSPITAL ENCOUNTER (EMERGENCY)
Facility: HOSPITAL | Age: 57
Discharge: HOME | End: 2022-05-28
Attending: STUDENT IN AN ORGANIZED HEALTH CARE EDUCATION/TRAINING PROGRAM
Payer: MEDICAID

## 2022-05-28 VITALS
BODY MASS INDEX: 24.11 KG/M2 | OXYGEN SATURATION: 100 % | WEIGHT: 150 LBS | HEART RATE: 84 BPM | RESPIRATION RATE: 16 BRPM | TEMPERATURE: 99 F | DIASTOLIC BLOOD PRESSURE: 94 MMHG | HEIGHT: 66 IN | SYSTOLIC BLOOD PRESSURE: 143 MMHG

## 2022-05-28 DIAGNOSIS — N39.0 URINARY TRACT INFECTION WITHOUT HEMATURIA, SITE UNSPECIFIED: Primary | ICD-10-CM

## 2022-05-28 LAB
APPEARANCE UR: CLEAR
BACTERIA #/AREA URNS AUTO: ABNORMAL /HPF
BILIRUB UR QL STRIP.AUTO: NEGATIVE MG/DL
COLOR UR AUTO: YELLOW
GLUCOSE UR QL STRIP.AUTO: NEGATIVE MG/DL
KETONES UR QL STRIP.AUTO: NEGATIVE MG/DL
LEUKOCYTE ESTERASE UR QL STRIP.AUTO: ABNORMAL UNIT/L
NITRITE UR QL STRIP.AUTO: NEGATIVE
PH UR STRIP.AUTO: 6 [PH]
PROT UR QL STRIP.AUTO: NEGATIVE MG/DL
RBC #/AREA URNS AUTO: ABNORMAL /HPF
RBC UR QL AUTO: ABNORMAL UNIT/L
SP GR UR STRIP.AUTO: 1.02
SQUAMOUS #/AREA URNS AUTO: ABNORMAL /LPF
UROBILINOGEN UR STRIP-ACNC: 0.2 MG/DL
WBC #/AREA URNS AUTO: ABNORMAL /HPF

## 2022-05-28 PROCEDURE — 81001 URINALYSIS AUTO W/SCOPE: CPT | Performed by: STUDENT IN AN ORGANIZED HEALTH CARE EDUCATION/TRAINING PROGRAM

## 2022-05-28 PROCEDURE — 99283 EMERGENCY DEPT VISIT LOW MDM: CPT | Mod: 25

## 2022-05-28 RX ORDER — NITROFURANTOIN 25; 75 MG/1; MG/1
100 CAPSULE ORAL 2 TIMES DAILY
Qty: 10 CAPSULE | Refills: 0 | Status: SHIPPED | OUTPATIENT
Start: 2022-05-28 | End: 2022-05-28 | Stop reason: SDUPTHER

## 2022-05-28 RX ORDER — NITROFURANTOIN 25; 75 MG/1; MG/1
100 CAPSULE ORAL 2 TIMES DAILY
Qty: 10 CAPSULE | Refills: 0 | Status: SHIPPED | OUTPATIENT
Start: 2022-05-28 | End: 2022-06-02

## 2022-05-29 NOTE — ED PROVIDER NOTES
Encounter Date: 5/28/2022       History     Chief Complaint   Patient presents with    Urinary Frequency     Pt states started having pressure and stinging with urination. Did have bladder surgery in november 56 F presents with c/o urinary pressure, dysuria, feeling of incomplete emptying. Denies flank/back pain, n/v/cp/sob , fever or any other symptoms          Review of patient's allergies indicates:  No Known Allergies  History reviewed. No pertinent past medical history.  History reviewed. No pertinent surgical history.  History reviewed. No pertinent family history.  Social History     Tobacco Use    Smoking status: Never Smoker    Smokeless tobacco: Never Used   Substance Use Topics    Alcohol use: Never    Drug use: Never     Review of Systems   Constitutional: Negative for fever.   HENT: Negative for sore throat.    Respiratory: Negative for shortness of breath.    Cardiovascular: Negative for chest pain.   Gastrointestinal: Negative for nausea.   Genitourinary: Negative for dysuria.        Neg except as stated in HPI   Musculoskeletal: Negative for back pain.   Skin: Negative for rash.   Neurological: Negative for weakness.   Hematological: Does not bruise/bleed easily.       Physical Exam     Initial Vitals [05/28/22 1613]   BP Pulse Resp Temp SpO2   (!) 143/94 84 16 98.8 °F (37.1 °C) 100 %      MAP       --         Physical Exam    Nursing note and vitals reviewed.  Constitutional: She appears well-developed and well-nourished.   HENT:   Head: Normocephalic and atraumatic.   Eyes: EOM are normal. Pupils are equal, round, and reactive to light.   Neck: Neck supple.   Normal range of motion.  Cardiovascular: Normal rate, regular rhythm, normal heart sounds and intact distal pulses.   Pulmonary/Chest: Breath sounds normal.   Abdominal: Abdomen is soft. Bowel sounds are normal.   No flank /CVA pain   Musculoskeletal:         General: Normal range of motion.      Cervical back: Normal range of motion  and neck supple.     Neurological: She is alert and oriented to person, place, and time. She has normal strength.   Skin: Skin is warm and dry.   Psychiatric: She has a normal mood and affect. Her behavior is normal. Judgment and thought content normal.         ED Course   Procedures  Labs Reviewed   URINALYSIS, REFLEX TO URINE CULTURE - Abnormal; Notable for the following components:       Result Value    Blood, UA Trace-Lysed (*)     Leukocyte Esterase, UA Moderate (*)     All other components within normal limits   URINALYSIS, MICROSCOPIC - Abnormal; Notable for the following components:    WBC, UA 50-99 (*)     Squamous Epithelial Cells, UA Few (*)     All other components within normal limits   CULTURE, URINE          Imaging Results    None          Medications - No data to display                       Clinical Impression:   Final diagnoses:  [N39.0] Urinary tract infection without hematuria, site unspecified (Primary)          ED Disposition Condition    Discharge Stable        ED Prescriptions     Medication Sig Dispense Start Date End Date Auth. Provider    nitrofurantoin, macrocrystal-monohydrate, (MACROBID) 100 MG capsule  (Status: Discontinued) Take 1 capsule (100 mg total) by mouth 2 (two) times daily. for 5 days 10 capsule 5/28/2022 5/28/2022 Carmel Vincent MD    nitrofurantoin, macrocrystal-monohydrate, (MACROBID) 100 MG capsule Take 1 capsule (100 mg total) by mouth 2 (two) times daily. for 5 days 10 capsule 5/28/2022 6/2/2022 Carmel Vincent MD        Follow-up Information    None          Carmel Vincent MD  05/29/22 0604

## 2022-05-31 LAB — BACTERIA UR CULT: ABNORMAL

## 2022-07-09 ENCOUNTER — LAB VISIT (OUTPATIENT)
Dept: LAB | Facility: HOSPITAL | Age: 57
End: 2022-07-09
Attending: NURSE PRACTITIONER
Payer: MEDICAID

## 2022-07-09 DIAGNOSIS — E78.2 MIXED HYPERLIPIDEMIA: ICD-10-CM

## 2022-07-09 DIAGNOSIS — Z00.00 ROUTINE GENERAL MEDICAL EXAMINATION AT A HEALTH CARE FACILITY: Primary | ICD-10-CM

## 2022-07-09 DIAGNOSIS — Z11.59 SCREENING EXAMINATION FOR POLIOMYELITIS: ICD-10-CM

## 2022-07-09 LAB
ALBUMIN SERPL-MCNC: 4.2 GM/DL (ref 3.5–5)
ALBUMIN/GLOB SERPL: 1.6 RATIO (ref 1.1–2)
ALP SERPL-CCNC: 55 UNIT/L (ref 40–150)
ALT SERPL-CCNC: 15 UNIT/L (ref 0–55)
AST SERPL-CCNC: 18 UNIT/L (ref 5–34)
BASOPHILS # BLD AUTO: 0.04 X10(3)/MCL (ref 0–0.2)
BASOPHILS NFR BLD AUTO: 0.8 %
BILIRUBIN DIRECT+TOT PNL SERPL-MCNC: 0.6 MG/DL
BUN SERPL-MCNC: 20.5 MG/DL (ref 9.8–20.1)
CALCIUM SERPL-MCNC: 9.5 MG/DL (ref 8.4–10.2)
CHLORIDE SERPL-SCNC: 106 MMOL/L (ref 98–107)
CHOLEST SERPL-MCNC: 205 MG/DL
CHOLEST/HDLC SERPL: 4 {RATIO} (ref 0–5)
CO2 SERPL-SCNC: 25 MMOL/L (ref 22–29)
CREAT SERPL-MCNC: 0.72 MG/DL (ref 0.55–1.02)
DEPRECATED CALCIDIOL+CALCIFEROL SERPL-MC: 57.3 NG/ML (ref 30–80)
EOSINOPHIL # BLD AUTO: 0.07 X10(3)/MCL (ref 0–0.9)
EOSINOPHIL NFR BLD AUTO: 1.4 %
ERYTHROCYTE [DISTWIDTH] IN BLOOD BY AUTOMATED COUNT: 13 % (ref 11.5–17)
GLOBULIN SER-MCNC: 2.6 GM/DL (ref 2.4–3.5)
GLUCOSE SERPL-MCNC: 96 MG/DL (ref 74–100)
HCT VFR BLD AUTO: 44.5 % (ref 37–47)
HDLC SERPL-MCNC: 55 MG/DL (ref 35–60)
HGB BLD-MCNC: 14.6 GM/DL (ref 12–16)
IMM GRANULOCYTES # BLD AUTO: 0 X10(3)/MCL (ref 0–0.04)
IMM GRANULOCYTES NFR BLD AUTO: 0 %
LDLC SERPL CALC-MCNC: 129 MG/DL (ref 50–140)
LYMPHOCYTES # BLD AUTO: 1.9 X10(3)/MCL (ref 0.6–4.6)
LYMPHOCYTES NFR BLD AUTO: 39.1 %
MCH RBC QN AUTO: 29.6 PG (ref 27–31)
MCHC RBC AUTO-ENTMCNC: 32.8 MG/DL (ref 33–36)
MCV RBC AUTO: 90.3 FL (ref 80–94)
MONOCYTES # BLD AUTO: 0.38 X10(3)/MCL (ref 0.1–1.3)
MONOCYTES NFR BLD AUTO: 7.8 %
NEUTROPHILS # BLD AUTO: 2.5 X10(3)/MCL (ref 2.1–9.2)
NEUTROPHILS NFR BLD AUTO: 50.9 %
PLATELET # BLD AUTO: 297 X10(3)/MCL (ref 130–400)
PMV BLD AUTO: 9.3 FL (ref 7.4–10.4)
POTASSIUM SERPL-SCNC: 4.1 MMOL/L (ref 3.5–5.1)
PROT SERPL-MCNC: 6.8 GM/DL (ref 6.4–8.3)
RBC # BLD AUTO: 4.93 X10(6)/MCL (ref 4.2–5.4)
SODIUM SERPL-SCNC: 142 MMOL/L (ref 136–145)
TRIGL SERPL-MCNC: 103 MG/DL (ref 37–140)
TSH SERPL-ACNC: 1.35 UIU/ML (ref 0.35–4.94)
VLDLC SERPL CALC-MCNC: 21 MG/DL
WBC # SPEC AUTO: 4.9 X10(3)/MCL (ref 4.5–11.5)

## 2022-07-09 PROCEDURE — 80053 COMPREHEN METABOLIC PANEL: CPT

## 2022-07-09 PROCEDURE — 80061 LIPID PANEL: CPT

## 2022-07-09 PROCEDURE — 36415 COLL VENOUS BLD VENIPUNCTURE: CPT

## 2022-07-09 PROCEDURE — 82306 VITAMIN D 25 HYDROXY: CPT

## 2022-07-09 PROCEDURE — 85025 COMPLETE CBC W/AUTO DIFF WBC: CPT

## 2022-07-09 PROCEDURE — 86803 HEPATITIS C AB TEST: CPT

## 2022-07-09 PROCEDURE — 84443 ASSAY THYROID STIM HORMONE: CPT

## 2022-07-11 LAB — HCV AB SERPL QL IA: NONREACTIVE

## 2022-09-15 ENCOUNTER — HISTORICAL (OUTPATIENT)
Dept: ADMINISTRATIVE | Facility: HOSPITAL | Age: 57
End: 2022-09-15
Payer: MEDICAID

## 2023-05-03 ENCOUNTER — HOSPITAL ENCOUNTER (EMERGENCY)
Facility: HOSPITAL | Age: 58
Discharge: HOME | End: 2023-05-03
Attending: SPECIALIST
Payer: MEDICAID

## 2023-05-03 VITALS
HEIGHT: 67 IN | TEMPERATURE: 98 F | SYSTOLIC BLOOD PRESSURE: 127 MMHG | WEIGHT: 142 LBS | BODY MASS INDEX: 22.29 KG/M2 | OXYGEN SATURATION: 100 % | RESPIRATION RATE: 18 BRPM | HEART RATE: 87 BPM | DIASTOLIC BLOOD PRESSURE: 81 MMHG

## 2023-05-03 DIAGNOSIS — N30.90 CYSTITIS: Primary | ICD-10-CM

## 2023-05-03 LAB
APPEARANCE UR: CLEAR
BACTERIA #/AREA URNS AUTO: ABNORMAL /HPF
BILIRUB UR QL STRIP.AUTO: NEGATIVE MG/DL
COLOR UR AUTO: YELLOW
GLUCOSE UR QL STRIP.AUTO: NEGATIVE MG/DL
KETONES UR QL STRIP.AUTO: NEGATIVE MG/DL
LEUKOCYTE ESTERASE UR QL STRIP.AUTO: ABNORMAL UNIT/L
NITRITE UR QL STRIP.AUTO: NEGATIVE
PH UR STRIP.AUTO: 6 [PH]
PROT UR QL STRIP.AUTO: NEGATIVE MG/DL
RBC #/AREA URNS AUTO: ABNORMAL /HPF
RBC UR QL AUTO: ABNORMAL UNIT/L
SP GR UR STRIP.AUTO: 1.01
SQUAMOUS #/AREA URNS AUTO: ABNORMAL /HPF
UROBILINOGEN UR STRIP-ACNC: 0.2 MG/DL
WBC #/AREA URNS AUTO: ABNORMAL /HPF
WBC CLUMPS UR QL AUTO: ABNORMAL /HPF

## 2023-05-03 PROCEDURE — 99283 EMERGENCY DEPT VISIT LOW MDM: CPT

## 2023-05-03 PROCEDURE — 25000003 PHARM REV CODE 250: Performed by: SPECIALIST

## 2023-05-03 PROCEDURE — 81001 URINALYSIS AUTO W/SCOPE: CPT | Performed by: SPECIALIST

## 2023-05-03 PROCEDURE — 87088 URINE BACTERIA CULTURE: CPT | Performed by: SPECIALIST

## 2023-05-03 RX ORDER — NITROFURANTOIN 25; 75 MG/1; MG/1
100 CAPSULE ORAL 2 TIMES DAILY
Qty: 10 CAPSULE | Refills: 0 | Status: SHIPPED | OUTPATIENT
Start: 2023-05-03 | End: 2023-05-08

## 2023-05-03 RX ORDER — NITROFURANTOIN 25; 75 MG/1; MG/1
100 CAPSULE ORAL
Status: COMPLETED | OUTPATIENT
Start: 2023-05-03 | End: 2023-05-03

## 2023-05-03 RX ADMIN — NITROFURANTOIN MONOHYDRATE/MACROCRYSTALS 100 MG: 25; 75 CAPSULE ORAL at 10:05

## 2023-05-04 NOTE — ED PROVIDER NOTES
Encounter Date: 5/3/2023       History     Chief Complaint   Patient presents with    Abdominal Pain     Patient c/o right lower abdominal pain that started yesterday and yellowish mucus discharge from vaginal area that started today.      Patient with lower pelvic discomfort and states it feels somewhat like a urinary tract infection, comes and goes, no fever, no dysuria or hesitancy or frequency    The history is provided by the patient.   Review of patient's allergies indicates:  No Known Allergies  No past medical history on file.  Past Surgical History:   Procedure Laterality Date    BILATERAL SALPINGOOPHORECTOMY       SECTION      CYSTOSCOPY      DILATION AND CURETTAGE OF UTERUS      HYSTERECTOMY      HYSTERECTOMY, VAGINAL, WITH UTEROSACRAL LIGAMENT VAULT SUSPENSION      HYSTEROSCOPY      TONSILLECTOMY, ADENOIDECTOMY      VAGINAL HYSTERECTOMY W/ ANTERIOR AND POSTERIOR VAGINAL REPAIR       Family History   Problem Relation Age of Onset    Heart disease Father      Social History     Tobacco Use    Smoking status: Never    Smokeless tobacco: Never   Substance Use Topics    Alcohol use: Never    Drug use: Never     Review of Systems   Constitutional: Negative.    HENT: Negative.     Respiratory: Negative.     Cardiovascular: Negative.    Gastrointestinal: Negative.    Musculoskeletal: Negative.    Skin: Negative.    Neurological: Negative.    All other systems reviewed and are negative.    Physical Exam     Initial Vitals [23]   BP Pulse Resp Temp SpO2   127/81 87 18 98.3 °F (36.8 °C) 100 %      MAP       --         Physical Exam    Nursing note and vitals reviewed.  Constitutional: She appears well-developed and well-nourished.   HENT:   Head: Normocephalic and atraumatic.   Eyes: Pupils are equal, round, and reactive to light.   Neck:   Normal range of motion.  Cardiovascular:  Normal rate, regular rhythm and normal heart sounds.           Pulmonary/Chest: Breath sounds normal.   Abdominal:  Abdomen is soft. She exhibits no distension. There is no abdominal tenderness. There is no rebound.   Musculoskeletal:         General: Normal range of motion.      Cervical back: Normal range of motion.     Neurological: She is alert and oriented to person, place, and time. She has normal strength.   Skin: Skin is warm and dry.       ED Course   Procedures  Labs Reviewed   URINALYSIS, REFLEX TO URINE CULTURE - Abnormal; Notable for the following components:       Result Value    Blood, UA Trace-Intact (*)     Leukocyte Esterase, UA Moderate (*)     All other components within normal limits   URINALYSIS, MICROSCOPIC - Abnormal; Notable for the following components:    WBC Clumps, UA Few (*)     WBC, UA 11-20 (*)     Squamous Epithelial Cells, UA Few (*)     All other components within normal limits   CULTURE, URINE          Imaging Results    None          Medications   nitrofurantoin (macrocrystal-monohydrate) 100 MG capsule 100 mg (100 mg Oral Given 5/3/23 7261)     Medical Decision Making:   Initial Assessment:   Lower pelvic discomfort that comes and goes, minimal  Differential Diagnosis:   Cystitis, UTI, IBS  Clinical Tests:   Lab Tests: Ordered and Reviewed  ED Management:  Patient's urinalysis did show white blood cells; she states symptoms are similar to previous UTI she had; we will give Macrodantin in the emergency room and a prescription twice a day for 5 days; encouraged adequate hydration; understands return if she develops fever or worsening symptoms                        Clinical Impression:   Final diagnoses:  [N30.90] Cystitis (Primary)        ED Disposition Condition    Discharge Stable          ED Prescriptions       Medication Sig Dispense Start Date End Date Auth. Provider    nitrofurantoin, macrocrystal-monohydrate, (MACROBID) 100 MG capsule Take 1 capsule (100 mg total) by mouth 2 (two) times daily. for 5 days 10 capsule 5/3/2023 5/8/2023 Brayan Wynne MD          Follow-up Information        Follow up With Specialties Details Why Contact Info    [facility]  As needed [address] [facility] [address]  [phone]               Brayan Wynne MD  05/04/23 0119

## 2023-05-06 LAB — BACTERIA UR CULT: NO GROWTH

## 2023-06-19 ENCOUNTER — HOSPITAL ENCOUNTER (EMERGENCY)
Facility: HOSPITAL | Age: 58
Discharge: HOME | End: 2023-06-19
Attending: SPECIALIST
Payer: MEDICAID

## 2023-06-19 VITALS
BODY MASS INDEX: 21.97 KG/M2 | TEMPERATURE: 97 F | HEART RATE: 98 BPM | RESPIRATION RATE: 18 BRPM | OXYGEN SATURATION: 97 % | WEIGHT: 140 LBS | SYSTOLIC BLOOD PRESSURE: 141 MMHG | HEIGHT: 67 IN | DIASTOLIC BLOOD PRESSURE: 87 MMHG

## 2023-06-19 DIAGNOSIS — E86.0 MILD DEHYDRATION: ICD-10-CM

## 2023-06-19 DIAGNOSIS — R53.1 WEAKNESS: ICD-10-CM

## 2023-06-19 DIAGNOSIS — R42 DIZZINESS: Primary | ICD-10-CM

## 2023-06-19 LAB
ALBUMIN SERPL-MCNC: 3.9 G/DL (ref 3.5–5)
ALBUMIN/GLOB SERPL: 1.4 RATIO (ref 1.1–2)
ALP SERPL-CCNC: 50 UNIT/L (ref 40–150)
ALT SERPL-CCNC: 16 UNIT/L (ref 0–55)
AST SERPL-CCNC: 16 UNIT/L (ref 5–34)
BASOPHILS # BLD AUTO: 0.03 X10(3)/MCL
BASOPHILS NFR BLD AUTO: 0.4 %
BILIRUBIN DIRECT+TOT PNL SERPL-MCNC: 0.4 MG/DL
BUN SERPL-MCNC: 22.4 MG/DL (ref 9.8–20.1)
CALCIUM SERPL-MCNC: 8.9 MG/DL (ref 8.4–10.2)
CHLORIDE SERPL-SCNC: 105 MMOL/L (ref 98–107)
CO2 SERPL-SCNC: 25 MMOL/L (ref 22–29)
CREAT SERPL-MCNC: 0.66 MG/DL (ref 0.55–1.02)
EOSINOPHIL # BLD AUTO: 0.1 X10(3)/MCL (ref 0–0.9)
EOSINOPHIL NFR BLD AUTO: 1.4 %
ERYTHROCYTE [DISTWIDTH] IN BLOOD BY AUTOMATED COUNT: 12.6 % (ref 11.5–17)
GFR SERPLBLD CREATININE-BSD FMLA CKD-EPI: >60 MLS/MIN/1.73/M2
GLOBULIN SER-MCNC: 2.8 GM/DL (ref 2.4–3.5)
GLUCOSE SERPL-MCNC: 100 MG/DL (ref 74–100)
HCT VFR BLD AUTO: 42.5 % (ref 37–47)
HGB BLD-MCNC: 13.8 G/DL (ref 12–16)
IMM GRANULOCYTES # BLD AUTO: 0.01 X10(3)/MCL (ref 0–0.04)
IMM GRANULOCYTES NFR BLD AUTO: 0.1 %
LYMPHOCYTES # BLD AUTO: 2.18 X10(3)/MCL (ref 0.6–4.6)
LYMPHOCYTES NFR BLD AUTO: 31.6 %
MCH RBC QN AUTO: 29.6 PG (ref 27–31)
MCHC RBC AUTO-ENTMCNC: 32.5 G/DL (ref 33–36)
MCV RBC AUTO: 91.2 FL (ref 80–94)
MONOCYTES # BLD AUTO: 0.49 X10(3)/MCL (ref 0.1–1.3)
MONOCYTES NFR BLD AUTO: 7.1 %
NEUTROPHILS # BLD AUTO: 4.09 X10(3)/MCL (ref 2.1–9.2)
NEUTROPHILS NFR BLD AUTO: 59.4 %
PLATELET # BLD AUTO: 328 X10(3)/MCL (ref 130–400)
PMV BLD AUTO: 9.2 FL (ref 7.4–10.4)
POC CARDIAC TROPONIN I: 0 NG/ML (ref 0–0.08)
POTASSIUM SERPL-SCNC: 3.8 MMOL/L (ref 3.5–5.1)
PROT SERPL-MCNC: 6.7 GM/DL (ref 6.4–8.3)
RBC # BLD AUTO: 4.66 X10(6)/MCL (ref 4.2–5.4)
SAMPLE: NORMAL
SODIUM SERPL-SCNC: 139 MMOL/L (ref 136–145)
TSH SERPL-ACNC: 1.23 UIU/ML (ref 0.35–4.94)
WBC # SPEC AUTO: 6.9 X10(3)/MCL (ref 4.5–11.5)

## 2023-06-19 PROCEDURE — 93010 ELECTROCARDIOGRAM REPORT: CPT | Mod: ,,, | Performed by: INTERNAL MEDICINE

## 2023-06-19 PROCEDURE — 80053 COMPREHEN METABOLIC PANEL: CPT | Performed by: SPECIALIST

## 2023-06-19 PROCEDURE — 93005 ELECTROCARDIOGRAM TRACING: CPT

## 2023-06-19 PROCEDURE — 84484 ASSAY OF TROPONIN QUANT: CPT

## 2023-06-19 PROCEDURE — 85025 COMPLETE CBC W/AUTO DIFF WBC: CPT | Performed by: SPECIALIST

## 2023-06-19 PROCEDURE — 84443 ASSAY THYROID STIM HORMONE: CPT | Performed by: SPECIALIST

## 2023-06-19 PROCEDURE — 99284 EMERGENCY DEPT VISIT MOD MDM: CPT

## 2023-06-19 PROCEDURE — 93010 EKG 12-LEAD: ICD-10-PCS | Mod: ,,, | Performed by: INTERNAL MEDICINE

## 2023-06-20 NOTE — ED NOTES
Arrived POV stating that she has been having lightheadedness since March. Also states she is anxious and has had life stressors, deaths, the last few weeks. Only takes vitamins and protonix. Patient is tearful. Family present.

## 2023-06-20 NOTE — ED PROVIDER NOTES
Encounter Date: 2023       History     Chief Complaint   Patient presents with    Dizziness     Pt c/o lightheadedness x 3 weeks; denies any falls or n/v/d.      Patient notes off and on lightheadedness over 3 weeks and that she is been very stress with 2 deaths in the family    The history is provided by the patient and a relative.   Review of patient's allergies indicates:  No Known Allergies  No past medical history on file.  Past Surgical History:   Procedure Laterality Date    BILATERAL SALPINGOOPHORECTOMY       SECTION      CYSTOSCOPY      DILATION AND CURETTAGE OF UTERUS      HYSTERECTOMY      HYSTERECTOMY, VAGINAL, WITH UTEROSACRAL LIGAMENT VAULT SUSPENSION      HYSTEROSCOPY      TONSILLECTOMY, ADENOIDECTOMY      VAGINAL HYSTERECTOMY W/ ANTERIOR AND POSTERIOR VAGINAL REPAIR       Family History   Problem Relation Age of Onset    Heart disease Father      Social History     Tobacco Use    Smoking status: Never    Smokeless tobacco: Never   Substance Use Topics    Alcohol use: Never    Drug use: Never     Review of Systems   Constitutional: Negative.    HENT: Negative.     Respiratory: Negative.     Cardiovascular: Negative.    Gastrointestinal: Negative.    Musculoskeletal: Negative.    Skin: Negative.    Neurological:  Positive for light-headedness.   All other systems reviewed and are negative.    Physical Exam     Initial Vitals [23]   BP Pulse Resp Temp SpO2   (!) 141/87 98 18 97.2 °F (36.2 °C) 97 %      MAP       --         Physical Exam    Nursing note and vitals reviewed.  Constitutional: She appears well-developed and well-nourished.   HENT:   Head: Normocephalic and atraumatic.   Eyes: EOM are normal. Pupils are equal, round, and reactive to light.   Neck: Neck supple.   Normal range of motion.  Cardiovascular:  Normal rate, regular rhythm, normal heart sounds and intact distal pulses.           Pulmonary/Chest: Breath sounds normal.   Abdominal: Abdomen is soft. There is no  abdominal tenderness.   Musculoskeletal:         General: Normal range of motion.      Cervical back: Normal range of motion and neck supple.     Neurological: She is alert and oriented to person, place, and time. She has normal strength. No cranial nerve deficit. GCS score is 15. GCS eye subscore is 4. GCS verbal subscore is 5. GCS motor subscore is 6.   Skin: Skin is warm and dry.       ED Course   Procedures  Labs Reviewed   COMPREHENSIVE METABOLIC PANEL - Abnormal; Notable for the following components:       Result Value    Blood Urea Nitrogen 22.4 (*)     All other components within normal limits   CBC WITH DIFFERENTIAL - Abnormal; Notable for the following components:    MCHC 32.5 (*)     All other components within normal limits   TSH - Normal   CBC W/ AUTO DIFFERENTIAL    Narrative:     The following orders were created for panel order CBC auto differential.  Procedure                               Abnormality         Status                     ---------                               -----------         ------                     CBC with Differential[[id number]]        Abnormal            Final result                 Please view results for these tests on the individual orders.   TROPONIN ISTAT     EKG Readings: (Independently Interpreted)   Rhythm: Normal Sinus Rhythm. Heart Rate: 91. Ectopy: No Ectopy. Conduction: Normal. Clinical Impression: Normal Sinus Rhythm   Possible LAE; nonspecific ST and T-wave abnormality     Imaging Results    None          Medications - No data to display  Medical Decision Making:   History:   Old Medical Records: I decided to obtain old medical records.  Initial Assessment:   Light-headedness over 3 weeks off and on, anxiety; she denies any room spinning and no nausea  Differential Diagnosis:   Anxiety, dehydration, inner ear disorder, electrolyte abnormality, vertigo, AMI  Independently Interpreted Test(s):   I have ordered and independently interpreted EKG Reading(s) - see  "prior notes  Clinical Tests:   Lab Tests: Ordered and Reviewed  The following lab test(s) were unremarkable: CBC, CMP and Troponin       <> Summary of Lab: TSH within normal limits  ED Management:  Patient's labs were unremarkable other than slight elevation in BUN and possibly some mild dehydration; patient notes that she at times does not drink enough fluids; I discussed over-the-counter meclizine as a possibility to try if she continues to have symptoms and follow up with her primary care physician           ED Course as of 06/20/23 0116   Mon Jun 19, 2023 2023 BUN(!): 22.4 [DD]   2023 POC Cardiac Troponin I: 0.00 [DD]      ED Course User Index  [DD] Brayan Wynne MD          Patient Vitals for the past 24 hrs:   BP Temp Temp src Pulse Resp SpO2 Height Weight   06/19/23 1928 (!) 141/87 97.2 °F (36.2 °C) Oral 98 18 97 % 5' 7" (1.702 m) 63.5 kg (140 lb)   The patient is resting comfortably and in no acute distress.  She states that her symptoms have improved after treatment in Emergency Department. I personally discussed her test results and treatment plan.  Gave strict ED precautions.  Specific conditions for return to the emergency department and importance of follow up with her primary care provided or the physician listed on the discharge instructions.  Patient voices understanding and agrees to the plan discussed. All patients' questions have been answered at this time.   She has remained hemodynamically stable throughout entire stay in ED and is stable for discharge home.          Clinical Impression:   Final diagnoses:  [R53.1] Weakness  [R42] Dizziness (Primary)  [E86.0] Mild dehydration        ED Disposition Condition    Discharge Stable          ED Prescriptions    None       Follow-up Information       Follow up With Specialties Details Why Contact Info    [facility]  As needed [address] [facility] [id number] [phone]               Brayan Wynne, " MD  06/20/23 0118

## 2023-06-20 NOTE — ED NOTES
Pillow was given for comfort. Side rail raised for safety. Patient aware that it will take 30-45 minutes for lab results. POC troponin running in the room.

## 2023-06-22 ENCOUNTER — HOSPITAL ENCOUNTER (EMERGENCY)
Facility: HOSPITAL | Age: 58
Discharge: HOME | End: 2023-06-22
Attending: SPECIALIST
Payer: MEDICAID

## 2023-06-22 VITALS
OXYGEN SATURATION: 97 % | RESPIRATION RATE: 18 BRPM | DIASTOLIC BLOOD PRESSURE: 94 MMHG | HEART RATE: 87 BPM | BODY MASS INDEX: 21.93 KG/M2 | SYSTOLIC BLOOD PRESSURE: 128 MMHG | TEMPERATURE: 98 F | WEIGHT: 140 LBS

## 2023-06-22 DIAGNOSIS — R42 DIZZINESS: Primary | ICD-10-CM

## 2023-06-22 DIAGNOSIS — F41.9 ANXIETY: ICD-10-CM

## 2023-06-22 LAB
ALBUMIN SERPL-MCNC: 4.4 G/DL (ref 3.5–5)
ALBUMIN/GLOB SERPL: 1.4 RATIO (ref 1.1–2)
ALP SERPL-CCNC: 61 UNIT/L (ref 40–150)
ALT SERPL-CCNC: 20 UNIT/L (ref 0–55)
APPEARANCE UR: CLEAR
AST SERPL-CCNC: 21 UNIT/L (ref 5–34)
BACTERIA #/AREA URNS AUTO: NORMAL /HPF
BASOPHILS # BLD AUTO: 0.01 X10(3)/MCL
BASOPHILS NFR BLD AUTO: 0.2 %
BILIRUB UR QL STRIP.AUTO: NEGATIVE MG/DL
BILIRUBIN DIRECT+TOT PNL SERPL-MCNC: 0.2 MG/DL
BUN SERPL-MCNC: 17 MG/DL (ref 9.8–20.1)
CALCIUM SERPL-MCNC: 9.4 MG/DL (ref 8.4–10.2)
CHLORIDE SERPL-SCNC: 103 MMOL/L (ref 98–107)
CO2 SERPL-SCNC: 26 MMOL/L (ref 22–29)
COLOR UR: YELLOW
CREAT SERPL-MCNC: 0.67 MG/DL (ref 0.55–1.02)
EOSINOPHIL # BLD AUTO: 0.13 X10(3)/MCL (ref 0–0.9)
EOSINOPHIL NFR BLD AUTO: 2 %
ERYTHROCYTE [DISTWIDTH] IN BLOOD BY AUTOMATED COUNT: 12.8 % (ref 11.5–17)
GFR SERPLBLD CREATININE-BSD FMLA CKD-EPI: >60 MLS/MIN/1.73/M2
GLOBULIN SER-MCNC: 3.2 GM/DL (ref 2.4–3.5)
GLUCOSE SERPL-MCNC: 86 MG/DL (ref 74–100)
GLUCOSE UR QL STRIP.AUTO: NEGATIVE MG/DL
HCT VFR BLD AUTO: 44.6 % (ref 37–47)
HGB BLD-MCNC: 14 G/DL (ref 12–16)
KETONES UR QL STRIP.AUTO: NEGATIVE MG/DL
LEUKOCYTE ESTERASE UR QL STRIP.AUTO: ABNORMAL UNIT/L
LYMPHOCYTES # BLD AUTO: 2.52 X10(3)/MCL (ref 0.6–4.6)
LYMPHOCYTES NFR BLD AUTO: 38.4 %
MCH RBC QN AUTO: 30 PG (ref 27–31)
MCHC RBC AUTO-ENTMCNC: 31.4 G/DL (ref 33–36)
MCV RBC AUTO: 95.5 FL (ref 80–94)
MONOCYTES # BLD AUTO: 0.44 X10(3)/MCL (ref 0.1–1.3)
MONOCYTES NFR BLD AUTO: 6.7 %
NEUTROPHILS # BLD AUTO: 3.45 X10(3)/MCL (ref 2.1–9.2)
NEUTROPHILS NFR BLD AUTO: 52.5 %
NITRITE UR QL STRIP.AUTO: NEGATIVE
PH UR STRIP.AUTO: 6.5 [PH]
PLATELET # BLD AUTO: 273 X10(3)/MCL (ref 130–400)
PMV BLD AUTO: 10 FL (ref 7.4–10.4)
POTASSIUM SERPL-SCNC: 3.9 MMOL/L (ref 3.5–5.1)
PROT SERPL-MCNC: 7.6 GM/DL (ref 6.4–8.3)
PROT UR QL STRIP.AUTO: NEGATIVE MG/DL
RBC # BLD AUTO: 4.67 X10(6)/MCL (ref 4.2–5.4)
RBC #/AREA URNS AUTO: NORMAL /HPF
RBC UR QL AUTO: ABNORMAL UNIT/L
SODIUM SERPL-SCNC: 140 MMOL/L (ref 136–145)
SP GR UR STRIP.AUTO: 1.01
SQUAMOUS #/AREA URNS AUTO: NORMAL /HPF
UROBILINOGEN UR STRIP-ACNC: 0.2 MG/DL
WBC # SPEC AUTO: 6.56 X10(3)/MCL (ref 4.5–11.5)
WBC #/AREA URNS AUTO: NORMAL /HPF

## 2023-06-22 PROCEDURE — 85025 COMPLETE CBC W/AUTO DIFF WBC: CPT | Performed by: SPECIALIST

## 2023-06-22 PROCEDURE — 25000003 PHARM REV CODE 250: Performed by: SPECIALIST

## 2023-06-22 PROCEDURE — 99284 EMERGENCY DEPT VISIT MOD MDM: CPT | Mod: 25

## 2023-06-22 PROCEDURE — 81001 URINALYSIS AUTO W/SCOPE: CPT | Performed by: SPECIALIST

## 2023-06-22 PROCEDURE — 80053 COMPREHEN METABOLIC PANEL: CPT | Performed by: SPECIALIST

## 2023-06-22 RX ORDER — BUSPIRONE HYDROCHLORIDE 5 MG/1
5 TABLET ORAL ONCE
Status: COMPLETED | OUTPATIENT
Start: 2023-06-22 | End: 2023-06-22

## 2023-06-22 RX ORDER — BUSPIRONE HYDROCHLORIDE 5 MG/1
5 TABLET ORAL 3 TIMES DAILY
Qty: 20 TABLET | Refills: 0 | Status: SHIPPED | OUTPATIENT
Start: 2023-06-22 | End: 2023-07-06

## 2023-06-22 RX ADMIN — SODIUM CHLORIDE 1000 ML: 9 INJECTION, SOLUTION INTRAVENOUS at 08:06

## 2023-06-22 RX ADMIN — BUSPIRONE HYDROCHLORIDE 5 MG: 5 TABLET ORAL at 09:06

## 2023-06-23 NOTE — ED PROVIDER NOTES
"Encounter Date: 2023       History     Chief Complaint   Patient presents with    Dizziness     Ringing in ears and dizziness. Denies vertigo symptoms of room moving or spinning. States feels "drunk" Seen her 3 days ago     Patient seen a few days ago for similar symptoms of dizziness, anxiety but also notes some ringing in her ears; she denies room spinning or nausea, no chest pain, no shortness of breath, no fever, no cough    The history is provided by the patient.   Review of patient's allergies indicates:  No Known Allergies  No past medical history on file.  Past Surgical History:   Procedure Laterality Date    BILATERAL SALPINGOOPHORECTOMY       SECTION      CYSTOSCOPY      DILATION AND CURETTAGE OF UTERUS      HYSTERECTOMY      HYSTERECTOMY, VAGINAL, WITH UTEROSACRAL LIGAMENT VAULT SUSPENSION      HYSTEROSCOPY      TONSILLECTOMY, ADENOIDECTOMY      VAGINAL HYSTERECTOMY W/ ANTERIOR AND POSTERIOR VAGINAL REPAIR       Family History   Problem Relation Age of Onset    Heart disease Father      Social History     Tobacco Use    Smoking status: Never    Smokeless tobacco: Never   Substance Use Topics    Alcohol use: Never    Drug use: Never     Review of Systems   Constitutional: Negative.    HENT: Negative.     Respiratory: Negative.     Cardiovascular: Negative.    Gastrointestinal: Negative.    Musculoskeletal: Negative.    Skin: Negative.    Neurological:  Positive for dizziness.   Psychiatric/Behavioral:  The patient is nervous/anxious.    All other systems reviewed and are negative.    Physical Exam     Initial Vitals [23 1813]   BP Pulse Resp Temp SpO2   (!) 128/94 87 18 98 °F (36.7 °C) 97 %      MAP       --         Physical Exam    Nursing note and vitals reviewed.  Constitutional: She appears well-developed and well-nourished.   HENT:   Head: Normocephalic and atraumatic.   Right Ear: Tympanic membrane normal.   Left Ear: Tympanic membrane normal.   Mouth/Throat: Oropharynx is clear " and moist.   Eyes: Conjunctivae and EOM are normal. Pupils are equal, round, and reactive to light.   Neck: Neck supple. No JVD present.   Normal range of motion.  Cardiovascular:  Normal rate, regular rhythm, normal heart sounds and intact distal pulses.           Pulmonary/Chest: Breath sounds normal.   Abdominal: Abdomen is soft. There is no abdominal tenderness.   Musculoskeletal:         General: Normal range of motion.      Cervical back: Normal range of motion and neck supple.     Lymphadenopathy:     She has no cervical adenopathy.   Neurological: She is alert and oriented to person, place, and time. She has normal strength. No cranial nerve deficit. GCS score is 15. GCS eye subscore is 4. GCS verbal subscore is 5. GCS motor subscore is 6.   Skin: Skin is warm and dry.       ED Course   Procedures  Labs Reviewed   URINALYSIS, REFLEX TO URINE CULTURE - Abnormal; Notable for the following components:       Result Value    Blood, UA Trace-Intact (*)     Leukocyte Esterase, UA Small (*)     All other components within normal limits   CBC WITH DIFFERENTIAL - Abnormal; Notable for the following components:    MCV 95.5 (*)     MCHC 31.4 (*)     All other components within normal limits   URINALYSIS, MICROSCOPIC - Normal   CBC W/ AUTO DIFFERENTIAL    Narrative:     The following orders were created for panel order CBC auto differential.  Procedure                               Abnormality         Status                     ---------                               -----------         ------                     CBC with Differential[[id number]]        Abnormal            Final result                 Please view results for these tests on the individual orders.   COMPREHENSIVE METABOLIC PANEL          Imaging Results              CT Head Without Contrast (Final result)  Result time 06/22/23 19:58:55      Final result by Ronny Ashford MD (06/22/23 19:58:55)                   Impression:      No acute intracranial  findings.      Electronically signed by: Ronny Ashford  Date:    06/22/2023  Time:    19:58               Narrative:    EXAMINATION:  CT HEAD WITHOUT CONTRAST    CLINICAL HISTORY:  Dizziness, persistent/recurrent, cardiac or vascular cause suspected;    TECHNIQUE:  CT imaging of the head performed from the skull base to the vertex without intravenous contrast. DLP 1105 mGycm. Automatic exposure control, adjustment of mA/kV or iterative reconstruction technique was used to reduce radiation.    COMPARISON:  None Available.    FINDINGS:  There is no acute cortical infarct, hemorrhage or mass lesion.  The ventricles are normal in size.    Visualized paranasal sinuses and mastoid air cells are clear.                                       Medications   sodium chloride 0.9% bolus 1,000 mL 1,000 mL (1,000 mLs Intravenous New Bag 6/22/23 2010)   busPIRone tablet 5 mg (5 mg Oral Given 6/22/23 2136)     Medical Decision Making:   Initial Assessment:   Patient seen a few days ago for similar symptoms of dizziness, anxiety but also notes some ringing in her ears; she denies room spinning or nausea, no chest pain, no shortness of breath, no fever, no cough  Differential Diagnosis:   Anxiety, inner ear, vertigo  Clinical Tests:   Lab Tests: Ordered and Reviewed  The following lab test(s) were unremarkable: CBC and CMP  Radiological Study: Ordered and Reviewed  ED Management:  CT head done which was unremarkable; labs were unremarkable; physical exam was unremarkable; discussed anxiety with patient and will give BuSpar 5 mg in the emergency room and a prescription; she has an appointment with the primary care physician early next week and will consider neurology appointment as needed                 Patient Vitals for the past 24 hrs:   BP Temp Pulse Resp SpO2 Weight   06/22/23 1813 (!) 128/94 98 °F (36.7 °C) 87 18 97 % 63.5 kg (140 lb)   The patient is resting comfortably and in no acute distress.  She states that her symptoms have  improved after treatment in Emergency Department. I personally discussed her test results and treatment plan.  Gave strict ED precautions.  Specific conditions for return to the emergency department and importance of follow up with her primary care provided or the physician listed on the discharge instructions.  Patient voices understanding and agrees to the plan discussed. All patients' questions have been answered at this time.   She has remained hemodynamically stable throughout entire stay in ED and is stable for discharge home.          Clinical Impression:   Final diagnoses:  [R42] Dizziness (Primary)  [F41.9] Anxiety        ED Disposition Condition    Discharge Stable          ED Prescriptions       Medication Sig Dispense Start Date End Date Auth. Provider    busPIRone (BUSPAR) 5 MG Tab Take 1 tablet (5 mg total) by mouth 3 (three) times daily. As needed for 14 days 20 tablet 6/22/2023 7/6/2023 Brayan Wynne MD          Follow-up Information       Follow up With Specialties Details Why Contact Info    [facility]   [address] [place] [id number]  [phone]               Brayan Wynne MD  06/22/23 8941

## 2023-07-15 ENCOUNTER — LAB VISIT (OUTPATIENT)
Dept: LAB | Facility: HOSPITAL | Age: 58
End: 2023-07-15
Attending: NURSE PRACTITIONER
Payer: MEDICAID

## 2023-07-15 DIAGNOSIS — Z00.00 ROUTINE GENERAL MEDICAL EXAMINATION AT A HEALTH CARE FACILITY: Primary | ICD-10-CM

## 2023-07-15 DIAGNOSIS — Z79.899 ENCOUNTER FOR LONG-TERM (CURRENT) USE OF OTHER MEDICATIONS: ICD-10-CM

## 2023-07-15 DIAGNOSIS — E78.2 MIXED HYPERLIPIDEMIA: ICD-10-CM

## 2023-07-15 LAB
ALBUMIN SERPL-MCNC: 4.1 G/DL (ref 3.5–5)
ALBUMIN/GLOB SERPL: 1.5 RATIO (ref 1.1–2)
ALP SERPL-CCNC: 58 UNIT/L (ref 40–150)
ALT SERPL-CCNC: 19 UNIT/L (ref 0–55)
AST SERPL-CCNC: 17 UNIT/L (ref 5–34)
BASOPHILS # BLD AUTO: 0.03 X10(3)/MCL
BASOPHILS NFR BLD AUTO: 0.6 %
BILIRUBIN DIRECT+TOT PNL SERPL-MCNC: 0.5 MG/DL
BUN SERPL-MCNC: 17.9 MG/DL (ref 9.8–20.1)
CALCIUM SERPL-MCNC: 9.6 MG/DL (ref 8.4–10.2)
CHLORIDE SERPL-SCNC: 104 MMOL/L (ref 98–107)
CHOLEST SERPL-MCNC: 239 MG/DL
CHOLEST/HDLC SERPL: 4 {RATIO} (ref 0–5)
CO2 SERPL-SCNC: 30 MMOL/L (ref 22–29)
CREAT SERPL-MCNC: 0.64 MG/DL (ref 0.55–1.02)
DEPRECATED CALCIDIOL+CALCIFEROL SERPL-MC: 105 NG/ML (ref 30–80)
EOSINOPHIL # BLD AUTO: 0.13 X10(3)/MCL (ref 0–0.9)
EOSINOPHIL NFR BLD AUTO: 2.6 %
ERYTHROCYTE [DISTWIDTH] IN BLOOD BY AUTOMATED COUNT: 12.5 % (ref 11.5–17)
GFR SERPLBLD CREATININE-BSD FMLA CKD-EPI: >60 MLS/MIN/1.73/M2
GLOBULIN SER-MCNC: 2.8 GM/DL (ref 2.4–3.5)
GLUCOSE SERPL-MCNC: 92 MG/DL (ref 74–100)
HCT VFR BLD AUTO: 43.6 % (ref 37–47)
HDLC SERPL-MCNC: 61 MG/DL (ref 35–60)
HGB BLD-MCNC: 14.1 G/DL (ref 12–16)
IMM GRANULOCYTES # BLD AUTO: 0.01 X10(3)/MCL (ref 0–0.04)
IMM GRANULOCYTES NFR BLD AUTO: 0.2 %
LDLC SERPL CALC-MCNC: 155 MG/DL (ref 50–140)
LYMPHOCYTES # BLD AUTO: 2.07 X10(3)/MCL (ref 0.6–4.6)
LYMPHOCYTES NFR BLD AUTO: 41.2 %
MCH RBC QN AUTO: 29.6 PG (ref 27–31)
MCHC RBC AUTO-ENTMCNC: 32.3 G/DL (ref 33–36)
MCV RBC AUTO: 91.6 FL (ref 80–94)
MONOCYTES # BLD AUTO: 0.4 X10(3)/MCL (ref 0.1–1.3)
MONOCYTES NFR BLD AUTO: 8 %
NEUTROPHILS # BLD AUTO: 2.38 X10(3)/MCL (ref 2.1–9.2)
NEUTROPHILS NFR BLD AUTO: 47.4 %
PLATELET # BLD AUTO: 323 X10(3)/MCL (ref 130–400)
PMV BLD AUTO: 9.4 FL (ref 7.4–10.4)
POTASSIUM SERPL-SCNC: 4.7 MMOL/L (ref 3.5–5.1)
PROT SERPL-MCNC: 6.9 GM/DL (ref 6.4–8.3)
RBC # BLD AUTO: 4.76 X10(6)/MCL (ref 4.2–5.4)
SODIUM SERPL-SCNC: 143 MMOL/L (ref 136–145)
T4 FREE SERPL-MCNC: 0.96 NG/DL (ref 0.7–1.48)
TRIGL SERPL-MCNC: 117 MG/DL (ref 37–140)
TSH SERPL-ACNC: 1.44 UIU/ML (ref 0.35–4.94)
VLDLC SERPL CALC-MCNC: 23 MG/DL
WBC # SPEC AUTO: 5.02 X10(3)/MCL (ref 4.5–11.5)

## 2023-07-15 PROCEDURE — 85025 COMPLETE CBC W/AUTO DIFF WBC: CPT

## 2023-07-15 PROCEDURE — 80061 LIPID PANEL: CPT

## 2023-07-15 PROCEDURE — 84443 ASSAY THYROID STIM HORMONE: CPT

## 2023-07-15 PROCEDURE — 80053 COMPREHEN METABOLIC PANEL: CPT

## 2023-07-15 PROCEDURE — 84439 ASSAY OF FREE THYROXINE: CPT

## 2023-07-15 PROCEDURE — 36415 COLL VENOUS BLD VENIPUNCTURE: CPT

## 2023-07-15 PROCEDURE — 82306 VITAMIN D 25 HYDROXY: CPT

## 2023-11-09 ENCOUNTER — HOSPITAL ENCOUNTER (EMERGENCY)
Facility: HOSPITAL | Age: 58
Discharge: HOME | End: 2023-11-09
Attending: STUDENT IN AN ORGANIZED HEALTH CARE EDUCATION/TRAINING PROGRAM
Payer: MEDICAID

## 2023-11-09 VITALS
TEMPERATURE: 98 F | WEIGHT: 138 LBS | HEIGHT: 66 IN | RESPIRATION RATE: 21 BRPM | OXYGEN SATURATION: 96 % | BODY MASS INDEX: 22.18 KG/M2 | DIASTOLIC BLOOD PRESSURE: 54 MMHG | SYSTOLIC BLOOD PRESSURE: 108 MMHG | HEART RATE: 73 BPM

## 2023-11-09 DIAGNOSIS — R10.9 ABDOMINAL PAIN, UNSPECIFIED ABDOMINAL LOCATION: ICD-10-CM

## 2023-11-09 DIAGNOSIS — N20.0 KIDNEY STONE: Primary | ICD-10-CM

## 2023-11-09 LAB
ALBUMIN SERPL-MCNC: 4.3 G/DL (ref 3.4–4.8)
ALBUMIN/GLOB SERPL: 1.5 RATIO (ref 1.1–2)
ALP SERPL-CCNC: 60 UNIT/L (ref 40–150)
ALT SERPL-CCNC: 17 UNIT/L (ref 0–55)
APPEARANCE UR: CLEAR
AST SERPL-CCNC: 17 UNIT/L (ref 5–34)
BACTERIA #/AREA URNS AUTO: ABNORMAL /HPF
BASOPHILS # BLD AUTO: 0.04 X10(3)/MCL
BASOPHILS NFR BLD AUTO: 0.4 %
BILIRUB SERPL-MCNC: 0.4 MG/DL
BILIRUB UR QL STRIP.AUTO: NEGATIVE
BUN SERPL-MCNC: 21.5 MG/DL (ref 9.8–20.1)
CALCIUM SERPL-MCNC: 9.5 MG/DL (ref 8.4–10.2)
CHLORIDE SERPL-SCNC: 103 MMOL/L (ref 98–107)
CO2 SERPL-SCNC: 24 MMOL/L (ref 23–31)
COLOR UR AUTO: YELLOW
CREAT SERPL-MCNC: 0.71 MG/DL (ref 0.55–1.02)
EOSINOPHIL # BLD AUTO: 0.07 X10(3)/MCL (ref 0–0.9)
EOSINOPHIL NFR BLD AUTO: 0.7 %
ERYTHROCYTE [DISTWIDTH] IN BLOOD BY AUTOMATED COUNT: 12.7 % (ref 11.5–17)
GFR SERPLBLD CREATININE-BSD FMLA CKD-EPI: >60 MLS/MIN/1.73/M2
GLOBULIN SER-MCNC: 2.9 GM/DL (ref 2.4–3.5)
GLUCOSE SERPL-MCNC: 153 MG/DL (ref 82–115)
GLUCOSE UR QL STRIP.AUTO: 100
HCT VFR BLD AUTO: 44.6 % (ref 37–47)
HGB BLD-MCNC: 14.1 G/DL (ref 12–16)
IMM GRANULOCYTES # BLD AUTO: 0.03 X10(3)/MCL (ref 0–0.04)
IMM GRANULOCYTES NFR BLD AUTO: 0.3 %
KETONES UR QL STRIP.AUTO: NEGATIVE
LACTATE SERPL-SCNC: 2.7 MMOL/L (ref 0.5–2.2)
LACTATE SERPL-SCNC: 2.8 MMOL/L (ref 0.5–2.2)
LACTATE SERPL-SCNC: 2.9 MMOL/L (ref 0.5–2.2)
LEUKOCYTE ESTERASE UR QL STRIP.AUTO: ABNORMAL
LIPASE SERPL-CCNC: 55 U/L
LYMPHOCYTES # BLD AUTO: 2.3 X10(3)/MCL (ref 0.6–4.6)
LYMPHOCYTES NFR BLD AUTO: 22 %
MCH RBC QN AUTO: 29.3 PG (ref 27–31)
MCHC RBC AUTO-ENTMCNC: 31.6 G/DL (ref 33–36)
MCV RBC AUTO: 92.5 FL (ref 80–94)
MONOCYTES # BLD AUTO: 0.37 X10(3)/MCL (ref 0.1–1.3)
MONOCYTES NFR BLD AUTO: 3.5 %
NEUTROPHILS # BLD AUTO: 7.66 X10(3)/MCL (ref 2.1–9.2)
NEUTROPHILS NFR BLD AUTO: 73.1 %
NITRITE UR QL STRIP.AUTO: NEGATIVE
PH UR STRIP.AUTO: 6 [PH]
PLATELET # BLD AUTO: 400 X10(3)/MCL (ref 130–400)
PMV BLD AUTO: 9.2 FL (ref 7.4–10.4)
POTASSIUM SERPL-SCNC: 3.8 MMOL/L (ref 3.5–5.1)
PROT SERPL-MCNC: 7.2 GM/DL (ref 5.8–7.6)
PROT UR QL STRIP.AUTO: NEGATIVE
RBC # BLD AUTO: 4.82 X10(6)/MCL (ref 4.2–5.4)
RBC #/AREA URNS AUTO: ABNORMAL /HPF
RBC UR QL AUTO: ABNORMAL
SODIUM SERPL-SCNC: 142 MMOL/L (ref 136–145)
SP GR UR STRIP.AUTO: 1.02 (ref 1–1.03)
SQUAMOUS #/AREA URNS AUTO: ABNORMAL /HPF
URATE CRY URNS QL MICRO: ABNORMAL /HPF
UROBILINOGEN UR STRIP-ACNC: 0.2
WBC # SPEC AUTO: 10.47 X10(3)/MCL (ref 4.5–11.5)
WBC #/AREA URNS AUTO: ABNORMAL /HPF

## 2023-11-09 PROCEDURE — 63600175 PHARM REV CODE 636 W HCPCS: Performed by: STUDENT IN AN ORGANIZED HEALTH CARE EDUCATION/TRAINING PROGRAM

## 2023-11-09 PROCEDURE — 85025 COMPLETE CBC W/AUTO DIFF WBC: CPT | Performed by: STUDENT IN AN ORGANIZED HEALTH CARE EDUCATION/TRAINING PROGRAM

## 2023-11-09 PROCEDURE — 96374 THER/PROPH/DIAG INJ IV PUSH: CPT | Mod: 59

## 2023-11-09 PROCEDURE — 81001 URINALYSIS AUTO W/SCOPE: CPT | Performed by: STUDENT IN AN ORGANIZED HEALTH CARE EDUCATION/TRAINING PROGRAM

## 2023-11-09 PROCEDURE — 96361 HYDRATE IV INFUSION ADD-ON: CPT

## 2023-11-09 PROCEDURE — 96375 TX/PRO/DX INJ NEW DRUG ADDON: CPT

## 2023-11-09 PROCEDURE — 25000003 PHARM REV CODE 250: Performed by: STUDENT IN AN ORGANIZED HEALTH CARE EDUCATION/TRAINING PROGRAM

## 2023-11-09 PROCEDURE — 83605 ASSAY OF LACTIC ACID: CPT | Performed by: STUDENT IN AN ORGANIZED HEALTH CARE EDUCATION/TRAINING PROGRAM

## 2023-11-09 PROCEDURE — 25500020 PHARM REV CODE 255: Performed by: STUDENT IN AN ORGANIZED HEALTH CARE EDUCATION/TRAINING PROGRAM

## 2023-11-09 PROCEDURE — 99285 EMERGENCY DEPT VISIT HI MDM: CPT | Mod: 25

## 2023-11-09 PROCEDURE — 83690 ASSAY OF LIPASE: CPT | Performed by: STUDENT IN AN ORGANIZED HEALTH CARE EDUCATION/TRAINING PROGRAM

## 2023-11-09 PROCEDURE — 80053 COMPREHEN METABOLIC PANEL: CPT | Performed by: STUDENT IN AN ORGANIZED HEALTH CARE EDUCATION/TRAINING PROGRAM

## 2023-11-09 RX ORDER — NAPROXEN 500 MG/1
500 TABLET ORAL 2 TIMES DAILY WITH MEALS
Qty: 60 TABLET | Refills: 0 | Status: SHIPPED | OUTPATIENT
Start: 2023-11-09

## 2023-11-09 RX ORDER — ONDANSETRON 2 MG/ML
4 INJECTION INTRAMUSCULAR; INTRAVENOUS
Status: COMPLETED | OUTPATIENT
Start: 2023-11-09 | End: 2023-11-09

## 2023-11-09 RX ORDER — LORAZEPAM 2 MG/ML
0.5 INJECTION INTRAMUSCULAR
Status: COMPLETED | OUTPATIENT
Start: 2023-11-09 | End: 2023-11-09

## 2023-11-09 RX ORDER — TAMSULOSIN HYDROCHLORIDE 0.4 MG/1
0.4 CAPSULE ORAL
Status: COMPLETED | OUTPATIENT
Start: 2023-11-09 | End: 2023-11-09

## 2023-11-09 RX ORDER — TAMSULOSIN HYDROCHLORIDE 0.4 MG/1
0.4 CAPSULE ORAL DAILY
Qty: 10 CAPSULE | Refills: 0 | Status: SHIPPED | OUTPATIENT
Start: 2023-11-09 | End: 2024-11-08

## 2023-11-09 RX ORDER — HYOSCYAMINE SULFATE 0.12 MG/1
0.12 TABLET SUBLINGUAL
Status: COMPLETED | OUTPATIENT
Start: 2023-11-09 | End: 2023-11-09

## 2023-11-09 RX ORDER — MORPHINE SULFATE 4 MG/ML
4 INJECTION, SOLUTION INTRAMUSCULAR; INTRAVENOUS
Status: COMPLETED | OUTPATIENT
Start: 2023-11-09 | End: 2023-11-09

## 2023-11-09 RX ADMIN — MORPHINE SULFATE 4 MG: 4 INJECTION, SOLUTION INTRAMUSCULAR; INTRAVENOUS at 06:11

## 2023-11-09 RX ADMIN — SODIUM CHLORIDE 1000 ML: 9 INJECTION, SOLUTION INTRAVENOUS at 08:11

## 2023-11-09 RX ADMIN — TAMSULOSIN HYDROCHLORIDE 0.4 MG: 0.4 CAPSULE ORAL at 08:11

## 2023-11-09 RX ADMIN — LORAZEPAM 0.5 MG: 2 INJECTION INTRAMUSCULAR; INTRAVENOUS at 07:11

## 2023-11-09 RX ADMIN — IOPAMIDOL 100 ML: 755 INJECTION, SOLUTION INTRAVENOUS at 07:11

## 2023-11-09 RX ADMIN — ONDANSETRON 4 MG: 2 INJECTION INTRAMUSCULAR; INTRAVENOUS at 06:11

## 2023-11-09 RX ADMIN — HYOSCYAMINE SULFATE 0.12 MG: 0.12 TABLET SUBLINGUAL at 06:11

## 2023-11-09 RX ADMIN — SODIUM CHLORIDE 1000 ML: 9 INJECTION, SOLUTION INTRAVENOUS at 06:11

## 2023-11-09 NOTE — ED PROVIDER NOTES
Encounter Date: 2023       History     Chief Complaint   Patient presents with    Abdominal Pain     Patient c/o severe abdominal pain and diarrhea that started Tuesday morning after starting Augmentin on Monday evening.     HPI  Patient is a 58 year old female who presents to ER complaining abdominal pain, diarrhea that started Tuesday morning after starting Augmentin secondary to a ear infection.  She denies any fevers, chills.  She denies any blood in stool.  Review of patient's allergies indicates:  No Known Allergies  No past medical history on file.  Past Surgical History:   Procedure Laterality Date    BILATERAL SALPINGOOPHORECTOMY       SECTION      CYSTOSCOPY      DILATION AND CURETTAGE OF UTERUS      HYSTERECTOMY      HYSTERECTOMY, VAGINAL, WITH UTEROSACRAL LIGAMENT VAULT SUSPENSION      HYSTEROSCOPY      PARTIAL HYSTERECTOMY      TONSILLECTOMY, ADENOIDECTOMY      VAGINAL HYSTERECTOMY W/ ANTERIOR AND POSTERIOR VAGINAL REPAIR       Family History   Problem Relation Age of Onset    Heart disease Father      Social History     Tobacco Use    Smoking status: Never    Smokeless tobacco: Never   Substance Use Topics    Alcohol use: Never    Drug use: Never     Review of Systems   Constitutional:  Negative for fever.   HENT:  Negative for sore throat.    Eyes:  Negative for visual disturbance.   Respiratory:  Negative for shortness of breath.    Cardiovascular:  Negative for chest pain.   Gastrointestinal:  Positive for abdominal pain and diarrhea. Negative for nausea.   Endocrine: Negative for polyuria.   Genitourinary:  Negative for dysuria.   Musculoskeletal:  Negative for back pain.   Skin:  Negative for rash.   Neurological:  Negative for weakness.   Hematological:  Does not bruise/bleed easily.   All other systems reviewed and are negative.      Physical Exam     Initial Vitals [23 0556]   BP Pulse Resp Temp SpO2   (!) 152/97 64 20 98.3 °F (36.8 °C) 97 %      MAP       --         Physical  Exam    Nursing note and vitals reviewed.  Constitutional: Vital signs are normal. She appears well-developed and well-nourished. She is not diaphoretic. She is active.  Non-toxic appearance. She does not appear ill. No distress.   HENT:   Head: Normocephalic and atraumatic.   Eyes: Conjunctivae are normal. Pupils are equal, round, and reactive to light. Right conjunctiva is not injected. Left conjunctiva is not injected.   Neck: Trachea normal. Neck supple.   Normal range of motion.   Full passive range of motion without pain.     Cardiovascular:  Normal rate, regular rhythm, S1 normal, S2 normal, intact distal pulses and normal pulses.           Pulmonary/Chest: Breath sounds normal. No respiratory distress.   Abdominal: Abdomen is soft. Bowel sounds are normal. There is no abdominal tenderness.   Abdomen is soft however mild tenderness noted to the mid abdomen, no rebound, guarding.  No palpable masses.  Normoactive bowel sounds.   Musculoskeletal:         General: No tenderness or edema. Normal range of motion.      Cervical back: Full passive range of motion without pain, normal range of motion and neck supple. No rigidity.      Right lower leg: No swelling. No edema.      Left lower leg: No swelling. No edema.     Neurological: She is alert and oriented to person, place, and time.   Skin: Skin is warm and dry. Capillary refill takes less than 2 seconds.         ED Course   Procedures  Labs Reviewed   COMPREHENSIVE METABOLIC PANEL - Abnormal; Notable for the following components:       Result Value    Glucose Level 153 (*)     Blood Urea Nitrogen 21.5 (*)     All other components within normal limits   LACTIC ACID, PLASMA - Abnormal; Notable for the following components:    Lactic Acid Level 2.8 (*)     All other components within normal limits   CBC WITH DIFFERENTIAL - Abnormal; Notable for the following components:    MCHC 31.6 (*)     All other components within normal limits   LACTIC ACID, PLASMA -  Abnormal; Notable for the following components:    Lactic Acid Level 2.7 (*)     All other components within normal limits   URINALYSIS, REFLEX TO URINE CULTURE - Abnormal; Notable for the following components:    Glucose,  (*)     Blood, UA Trace-Intact (*)     Leukocyte Esterase, UA Trace (*)     All other components within normal limits   URINALYSIS, MICROSCOPIC - Abnormal; Notable for the following components:    Uric Acid Crystals, UA Moderate (*)     Squamous Epithelial Cells, UA Few (*)     All other components within normal limits   LACTIC ACID, PLASMA - Abnormal; Notable for the following components:    Lactic Acid Level 2.9 (*)     All other components within normal limits   LIPASE - Normal   CBC W/ AUTO DIFFERENTIAL    Narrative:     The following orders were created for panel order CBC auto differential.  Procedure                               Abnormality         Status                     ---------                               -----------         ------                     CBC with Differential[[id number]]        Abnormal            Final result                 Please view results for these tests on the individual orders.          Imaging Results              CT Abdomen Pelvis With IV Contrast (Final result)  Result time 11/09/23 07:39:24      Final result by Sara Fallon MD (11/09/23 07:39:24)                   Impression:      Punctate calculus at the right UVJ with mild right hydronephrosis, urothelial enhancement and periureteral inflammatory changes      Electronically signed by: Sara Fallon  Date:    11/09/2023  Time:    07:39               Narrative:    EXAMINATION:  CT ABDOMEN PELVIS WITH IV CONTRAST    CLINICAL HISTORY:  abdominal pain and diarrhea;    TECHNIQUE:  CT imaging was performed of the abdomen and pelvis after the administration of intravenous contrast. Dose length product is 409 mGycm. Automatic exposure control, adjustment of mA/kV or iterative reconstruction  technique was used to limit radiation dose.    COMPARISON:  None    FINDINGS:  Liver: Normal.    Gallbladder and biliary tree: No calcified gallstones. No intra or extrahepatic biliary ductal dilation.    Pancreas: Normal.    Spleen: Normal.    Adrenals: Normal.    Kidneys and ureters: There is a punctate calcification at the right ureterovesicular junction.  There is mild right hydronephrosis with urothelial enhancement.  There is fluid around the proximal ureter.  The bladder is decompressed.    Bladder: Normal.    Reproductive organs: The uterus has been surgically resected.    Stomach/bowel: No evidence of bowel obstruction.  No discernible bowel inflammation.    Lymph nodes: No pathologically enlarged lymph node identified.    Peritoneum: No ascites or free air. No fluid collection.    Vessels: No abdominal aortic aneurysm.    Abdominal wall: Normal.    Lung bases: No consolidation or pleural effusion.    Bones: No acute osseous findings.                                       Medications   sodium chloride 0.9% bolus 1,000 mL 1,000 mL (0 mLs Intravenous Stopped 11/9/23 0800)   hyoscyamine ODT 0.125 mg (0.125 mg Oral Given 11/9/23 0631)   morphine injection 4 mg (4 mg Intravenous Given 11/9/23 0631)   ondansetron injection 4 mg (4 mg Intravenous Given 11/9/23 0630)   LORazepam injection 0.5 mg (0.5 mg Intravenous Given 11/9/23 0718)   iopamidoL (ISOVUE-370) injection 100 mL (100 mLs Intravenous Given 11/9/23 0732)   tamsulosin 24 hr capsule 0.4 mg (0.4 mg Oral Given 11/9/23 0805)   sodium chloride 0.9% bolus 1,000 mL 1,000 mL (0 mLs Intravenous Stopped 11/9/23 0931)     Medical Decision Making  Amount and/or Complexity of Data Reviewed  Labs: ordered.  Radiology: ordered.    Risk  Prescription drug management.                          Medical Decision Making:   Initial Assessment:   Patient is a 58 year old female who presents to ER complaining abdominal pain, diarrhea that started Tuesday morning after starting  Augmentin secondary to a ear infection.  Differential Diagnosis:   Medication side-effect, enteritis, diverticulitis, KHOA, bowel spasms, nephrolithiasis, volvulus, ileus, intra-abdominal perforation  Clinical Tests:   Lab Tests: Ordered and Reviewed  Radiological Study: Ordered and Reviewed  ED Management:  Patient labs returned noted no acute leukocytosis, no judy anemia.  No gross electrolyte derangements noted.  Initial lactic acid was elevated at 2.8 however vitals stable.  CT abdomen pelvis returned negative for any acute intra-abdominal process other than punctate calculus noted at the right UVJ with mild right-sided hydronephrosis.  Repeat lactic obtained, additional fluid IV fluids were given.  Labs returned returned with lactic of 2.9.  I reassessed the patient, she is having no abdominal pain.  Abdomen is soft, no rebound, no guarding.  Patient standing up in the room, will like to go home.  She is tolerating p.o. here in the ER without difficulty.  UA also obtained negative for any UTI.  I recommended additional L of IV fluids here in the ER prior to discharge on repeat lactic acid however patient states she would like to go home as she feels much better and is having no symptoms at this time.  Recommended she return to ER if symptoms change, worsen she has any worsening game condition to return to ER for further care and evaluation.  Patient going home with her son who will be driving.    Nils Mccall M.D.  Emergency Medicine          Clinical Impression:   Final diagnoses:  [R10.9] Abdominal pain, unspecified abdominal location  [N20.0] Kidney stone (Primary)        ED Disposition Condition    Discharge Stable          ED Prescriptions       Medication Sig Dispense Start Date End Date Auth. Provider    tamsulosin (FLOMAX) 0.4 mg Cap Take 1 capsule (0.4 mg total) by mouth once daily. 10 capsule 11/9/2023 11/8/2024 Nils Mccall MD    naproxen (NAPROSYN) 500 MG tablet Take 1 tablet (500 mg total) by  mouth 2 (two) times daily with meals. 60 tablet 11/9/2023 -- Nils Mccall MD          Follow-up Information       Follow up With Specialties Details Why Contact Info    Radha Jack, JAYYP Nurse Practitioner Schedule an appointment as soon as possible for a visit in 2 days For follow up [address] [facility] [address]  [phone]      [facility] - Emergency Dept Emergency Medicine  If symptoms worsen [address] [facility] [id number] [phone]             Nils Mccall MD  11/09/23 1044

## 2023-12-13 ENCOUNTER — LAB VISIT (OUTPATIENT)
Dept: LAB | Facility: HOSPITAL | Age: 58
End: 2023-12-13
Attending: NURSE PRACTITIONER
Payer: MEDICAID

## 2023-12-13 DIAGNOSIS — R19.7 DIARRHEA OF PRESUMED INFECTIOUS ORIGIN: Primary | ICD-10-CM

## 2023-12-13 LAB
CLOSTRIDIUM DIFFICILE GDH ANTIGEN (OHS): POSITIVE
CLOSTRIDIUM DIFFICILE TOXIN A/B (OHS): POSITIVE

## 2023-12-13 PROCEDURE — 87077 CULTURE AEROBIC IDENTIFY: CPT

## 2023-12-13 PROCEDURE — 86318 IA INFECTIOUS AGENT ANTIBODY: CPT

## 2023-12-13 PROCEDURE — 87209 SMEAR COMPLEX STAIN: CPT

## 2023-12-14 LAB — O+P STL MICRO: NORMAL

## 2023-12-15 LAB — BACTERIA STL CULT: NORMAL
